# Patient Record
Sex: FEMALE | Race: OTHER | HISPANIC OR LATINO
[De-identification: names, ages, dates, MRNs, and addresses within clinical notes are randomized per-mention and may not be internally consistent; named-entity substitution may affect disease eponyms.]

---

## 2018-03-13 ENCOUNTER — APPOINTMENT (OUTPATIENT)
Dept: ENDOCRINOLOGY | Facility: CLINIC | Age: 38
End: 2018-03-13

## 2018-03-15 ENCOUNTER — APPOINTMENT (OUTPATIENT)
Dept: INTERNAL MEDICINE | Facility: CLINIC | Age: 38
End: 2018-03-15
Payer: COMMERCIAL

## 2018-03-15 VITALS
HEART RATE: 71 BPM | HEIGHT: 62 IN | TEMPERATURE: 98.2 F | BODY MASS INDEX: 26.31 KG/M2 | WEIGHT: 143 LBS | DIASTOLIC BLOOD PRESSURE: 70 MMHG | SYSTOLIC BLOOD PRESSURE: 106 MMHG | OXYGEN SATURATION: 98 % | RESPIRATION RATE: 15 BRPM

## 2018-03-15 DIAGNOSIS — Z83.42 FAMILY HISTORY OF FAMILIAL HYPERCHOLESTEROLEMIA: ICD-10-CM

## 2018-03-15 DIAGNOSIS — Z00.00 ENCOUNTER FOR GENERAL ADULT MEDICAL EXAMINATION W/OUT ABNORMAL FINDINGS: ICD-10-CM

## 2018-03-15 DIAGNOSIS — Z78.9 OTHER SPECIFIED HEALTH STATUS: ICD-10-CM

## 2018-03-15 DIAGNOSIS — H91.90 UNSPECIFIED HEARING LOSS, UNSPECIFIED EAR: ICD-10-CM

## 2018-03-15 DIAGNOSIS — Z83.49 FAMILY HISTORY OF OTHER ENDOCRINE, NUTRITIONAL AND METABOLIC DISEASES: ICD-10-CM

## 2018-03-15 PROCEDURE — 99386 PREV VISIT NEW AGE 40-64: CPT

## 2020-09-23 ENCOUNTER — APPOINTMENT (OUTPATIENT)
Dept: GASTROENTEROLOGY | Facility: CLINIC | Age: 40
End: 2020-09-23
Payer: COMMERCIAL

## 2020-09-23 ENCOUNTER — LABORATORY RESULT (OUTPATIENT)
Age: 40
End: 2020-09-23

## 2020-09-23 VITALS
HEIGHT: 61 IN | TEMPERATURE: 98 F | WEIGHT: 154 LBS | BODY MASS INDEX: 29.07 KG/M2 | SYSTOLIC BLOOD PRESSURE: 127 MMHG | DIASTOLIC BLOOD PRESSURE: 88 MMHG | OXYGEN SATURATION: 96 % | HEART RATE: 83 BPM

## 2020-09-23 DIAGNOSIS — Z11.59 ENCOUNTER FOR SCREENING FOR OTHER VIRAL DISEASES: ICD-10-CM

## 2020-09-23 DIAGNOSIS — E78.00 PURE HYPERCHOLESTEROLEMIA, UNSPECIFIED: ICD-10-CM

## 2020-09-23 DIAGNOSIS — R10.11 RIGHT UPPER QUADRANT PAIN: ICD-10-CM

## 2020-09-23 PROCEDURE — 99204 OFFICE O/P NEW MOD 45 MIN: CPT

## 2020-09-23 RX ORDER — DICYCLOMINE HYDROCHLORIDE 10 MG/1
10 CAPSULE ORAL 3 TIMES DAILY
Qty: 90 | Refills: 3 | Status: ACTIVE | COMMUNITY
Start: 2020-09-23 | End: 1900-01-01

## 2020-09-23 NOTE — HISTORY OF PRESENT ILLNESS
[None] : had no significant interval events [Heartburn] : denies heartburn [Vomiting] : denies vomiting [Diarrhea] : denies diarrhea [Constipation] : denies constipation [Yellow Skin Or Eyes (Jaundice)] : denies jaundice [Rectal Pain] : denies rectal pain [Wt Gain ___ Lbs] : recent [unfilled] ~Upound(s) weight gain [Nausea] : nausea [Abdominal Pain] : abdominal pain [Abdominal Swelling] : abdominal swelling [Wt Loss ___ Lbs] : no recent weight loss [GERD] : no gastroesophageal reflux disease [Hiatus Hernia] : no hiatus hernia [Peptic Ulcer Disease] : no peptic ulcer disease [Pancreatitis] : no pancreatitis [Cholelithiasis] : no cholelithiasis [Kidney Stone] : no kidney stone [Inflammatory Bowel Disease] : no inflammatory bowel disease [Irritable Bowel Syndrome] : no irritable bowel syndrome [Diverticulitis] : no diverticulitis [Alcohol Abuse] : no alcohol abuse [Malignancy] : no malignancy [Abdominal Surgery] : no abdominal surgery [Appendectomy] : no appendectomy [Cholecystectomy] : no cholecystectomy [de-identified] : The patient is a 40-year-old  female with past medical history significant for hypercholesterolemia who was referred to my office by Dr. Cortney Hester for abdominal pain and dyspepsia. The patient also admits to having atypical chest pain and nausea. I was asked to render an opinion for consultation for the above complaints.   The patient states that she is feeling uncomfortable.   The patient denies any prior exposure to hepatitis A, B or C.  The patient denies any large doses of nonsteroidal anti-inflammatory drugs or acetaminophen.   The patient denies sharing needles, razors, nail clippers, nail files, scissors, et cetera.  The patient denies any EtOH abuse, cocaine use or intravenous drug use.   The patient denies any prior blood transfusions, sexual indiscretions, tattoos or piercing.  The patient admits to having prior surgery.  The history of surgery is significant for a prior D+C.  The patient admits to a family history of GI or liver problems.  The patient’s father had a history of fatty liver. The patient complains of abdominal pain.  The patient describes the abdominal pain as a sharp, crampy, intermittent midepigastric and right upper quadrant discomfort that is nonradiating in nature.  The abdominal pain is worse with stress.  The abdominal pain is worse with meals and improves with passing gas or having a bowel movement.  The abdominal pain is described as being moderate in nature.  The abdominal pain occurs at night and in the morning.  The abdominal pain can occur at any time.   The abdominal pain never has awakened the patient from sleep.  The abdominal pain is not relieved with any medications.  The abdominal pain is associated with abdominal gas and bloating.  The patient complains of occasional nausea but denies any vomiting.  The patient denies any gastroesophageal reflux disease or dysphagia. The patient complains of atypical chest pain but denies any shortness of breath or palpitations.  The chest pain is described as a sharp, intermittent substernal discomfort that is nonradiating in nature.  The patient denies any diaphoresis.  The chest pain is described as being 5 out of 10 in intensity.  The chest pain can occur at any time.  The chest pain is worse at night and early morning.  The chest pain improves with passing gas.  The chest pain is unrelated to meals.  The chest pain never awakened the patient from sleep.  The patient denies any constipation or diarrhea.  The patient has 1 bowel movement every 1 to 2 days.  The patient denies a change in bowel habits.  The patient denies a change in caliber of stool.  The patient denies melena or hematemesis.  The lower GI bleeding is associated with internal hemorrhoids.  The patient complains of occasional rectal discomfort but denies any rectal pruritus. The patient denies any weight loss or anorexia.  The patient admits to gaining weight recently.  She denies any fevers or chills.  The patient denies any jaundice or pruritus.  The patient complains of occasional lower back pain.  The patient had a prior history of anemia secondary to heavy menstrual cycles.  She was previously followed by her gynecologist, Dr. Escalante.    The patient was treated with iron supplementation with improvement of the anemia.  The patient was previously followed by a hematologist, Dr. Osiel Parry.  She was treated with IV iron supplementation and later  switched to PO iron supplementation.   The patient had an abdominal ultrasound performed on May 5, 2009.  The abdominal ultrasound revealed no evidence of cholelithiasis or cholecystitis and no evidence of biliary obstruction.  The patient had a CAT scan of the abdomen and pelvis performed on May 5, 2009.  The CAT scan revealed a probable corpus luteal cyst in the left ovary with associated hemoperitoneum and no evidence of bowel obstruction or pneumoperitoneum and no evidence of appendicitis.  The patient admits to having a prior colonoscopy approximately 10 years ago by Dr. Wai Bobby.  According to the patient, the colonoscopy was unremarkable.  Stool studies performed on September 15, 2012 were negative for culture and sensitivity, ova and parasite x 3 and C. Shiga toxin.  The patient admits to having a recent abdominal ultrasound.  According to the patient, the abdominal ultrasound revealed a hepatic cyst.  The patient admits to having a prior colonoscopy performed by another gastroenterologist.  According to the patient, the colonoscopic findings revealed diverticulosis and internal hemorrhoids.   The patient's last menstrual period was on 2020. The patient's periods are regular at 28 to 30 days.  The patient's menstrual periods are light for 2 days.  The patient is a .  The patient's first menstrual period was at age 13. The patient admits to a family history of GI problems.  The patient’s father had a history of fatty liver. [de-identified] : (-) smoking, (-) ETOH, (-) IVDA\par

## 2020-09-25 LAB
ALBUMIN SERPL ELPH-MCNC: 4.5 G/DL
ALP BLD-CCNC: 110 U/L
ALT SERPL-CCNC: 184 U/L
AMYLASE/CREAT SERPL: 90 U/L
ANA SER IF-ACNC: NEGATIVE
ANION GAP SERPL CALC-SCNC: 15 MMOL/L
AST SERPL-CCNC: 114 U/L
BASOPHILS # BLD AUTO: 0.04 K/UL
BASOPHILS NFR BLD AUTO: 0.8 %
BILIRUB SERPL-MCNC: 0.5 MG/DL
BUN SERPL-MCNC: 10 MG/DL
CALCIUM SERPL-MCNC: 9.6 MG/DL
CHLORIDE SERPL-SCNC: 101 MMOL/L
CHOLEST SERPL-MCNC: 209 MG/DL
CHOLEST/HDLC SERPL: 3.4 RATIO
CO2 SERPL-SCNC: 22 MMOL/L
CREAT SERPL-MCNC: 0.66 MG/DL
EOSINOPHIL # BLD AUTO: 0.08 K/UL
EOSINOPHIL NFR BLD AUTO: 1.7 %
ERYTHROCYTE [SEDIMENTATION RATE] IN BLOOD BY WESTERGREN METHOD: 24 MM/HR
FERRITIN SERPL-MCNC: 187 NG/ML
GGT SERPL-CCNC: 348 U/L
GLUCOSE SERPL-MCNC: 98 MG/DL
HBV CORE IGG+IGM SER QL: NONREACTIVE
HBV CORE IGM SER QL: NONREACTIVE
HBV E AB SER QL: NEGATIVE
HBV E AG SER QL: NEGATIVE
HBV SURFACE AB SER QL: NONREACTIVE
HBV SURFACE AG SER QL: NONREACTIVE
HCT VFR BLD CALC: 41.4 %
HCV AB SER QL: NONREACTIVE
HCV S/CO RATIO: 0.08 S/CO
HDLC SERPL-MCNC: 61 MG/DL
HEMOCCULT STL QL: NEGATIVE
HEPATITIS A IGG ANTIBODY: REACTIVE
HGB BLD-MCNC: 13.7 G/DL
IGA SER QL IEP: 113 MG/DL
IMM GRANULOCYTES NFR BLD AUTO: 0.4 %
IRON SATN MFR SERPL: 46 %
IRON SERPL-MCNC: 186 UG/DL
LDLC SERPL CALC-MCNC: 109 MG/DL
LPL SERPL-CCNC: 33 U/L
LYMPHOCYTES # BLD AUTO: 1.59 K/UL
LYMPHOCYTES NFR BLD AUTO: 33.3 %
MAN DIFF?: NORMAL
MCHC RBC-ENTMCNC: 31.1 PG
MCHC RBC-ENTMCNC: 33.1 GM/DL
MCV RBC AUTO: 93.9 FL
MONOCYTES # BLD AUTO: 0.46 K/UL
MONOCYTES NFR BLD AUTO: 9.6 %
NEUTROPHILS # BLD AUTO: 2.58 K/UL
NEUTROPHILS NFR BLD AUTO: 54.2 %
PLATELET # BLD AUTO: 213 K/UL
POTASSIUM SERPL-SCNC: 4.1 MMOL/L
PROT SERPL-MCNC: 7.1 G/DL
RBC # BLD: 4.41 M/UL
RBC # FLD: 12.2 %
RHEUMATOID FACT SER QL: <10 IU/ML
SARS-COV-2 IGG SERPL IA-ACNC: 132 INDEX
SARS-COV-2 IGG SERPL QL IA: POSITIVE
SODIUM SERPL-SCNC: 138 MMOL/L
TIBC SERPL-MCNC: 403 UG/DL
TRIGL SERPL-MCNC: 196 MG/DL
TSH SERPL-ACNC: 2.88 UIU/ML
TTG IGA SER IA-ACNC: <1.2 U/ML
TTG IGA SER-ACNC: NEGATIVE
TTG IGG SER IA-ACNC: 1.6 U/ML
TTG IGG SER IA-ACNC: NEGATIVE
UIBC SERPL-MCNC: 217 UG/DL
WBC # FLD AUTO: 4.77 K/UL

## 2020-10-21 ENCOUNTER — APPOINTMENT (OUTPATIENT)
Dept: GASTROENTEROLOGY | Facility: CLINIC | Age: 40
End: 2020-10-21
Payer: COMMERCIAL

## 2020-10-21 VITALS
WEIGHT: 149 LBS | HEIGHT: 61 IN | HEART RATE: 75 BPM | OXYGEN SATURATION: 98 % | BODY MASS INDEX: 28.13 KG/M2 | DIASTOLIC BLOOD PRESSURE: 77 MMHG | SYSTOLIC BLOOD PRESSURE: 118 MMHG | TEMPERATURE: 97.7 F

## 2020-10-21 PROCEDURE — 99072 ADDL SUPL MATRL&STAF TM PHE: CPT

## 2020-10-21 PROCEDURE — 99214 OFFICE O/P EST MOD 30 MIN: CPT | Mod: 25

## 2020-10-21 NOTE — ASSESSMENT
[FreeTextEntry1] : Dyspepsia: The patient complains of dyspeptic symptoms.  The patient was advised to abide by an anti-gas diet.  The patient was given a pamphlet for anti-gas.  The patient and I reviewed the anti-gas diet at length. The patient is to start on a trial of Phazyme one tablet 3 times a day p.r.n. abdominal pain and gas.\par Abnormal Imaging Study: The abdominal ultrasound performed on October 6, 2020 revealed hepatomegaly with hepatic cysts and diffuse hepatic steatosis. There were no additional abnormal findings.  The abdominal ultrasound performed on October 31, 2014 a small benign-appearing hepatic cyst otherwise negative study. The repeat abdominal ultrasound performed on April 30, 2019 revealed mildly limited evaluation due to overlying bowel gas. There is an echogenic liver compatible with fatty infiltration with an indeterminate 1.7 cm right hepatic lobe lesion. There was no evidence of cholelithiasis. There is trace mildly hypoechoic nonmobile probable gallbladder sludge along the posterior wall which may represent tumefactive sludge. The MRI of the liver with and without IV contrast performed on May 15, 2019 revealed a 1.1 cm hepatic dome lesion likely an atypical hemangioma which corresponds to the findings on recent abdominal ultrasound. Also noted was mild diffuse hepatic steatosis a normal gallbladder.  I recommend a repeat MRI of the liver with and without IV contrast when the patient returns to the office to assess the stability of the right hepatic dome lesion.  the patient agreed and will followup.  \par Elevated Liver Enzymes: The blood work performed on September 23, 2020 revealed an elevated ESR of 24 mm/hr, elevated liver enzymes with an AST/ALT/GGTP of 114/184/348 U/L, respectively, an elevated iron level of 186 ug/dl, an elevated cholesterol/triglyceride level of 209/196 mg/dl, respectively, and elevated ferritin level of 187 ng/mL, a reactive hepatitis A IgG antibody. The COVID 19 IgG antibody was positive with an index of 132.00.\par Fatty Liver:  The patient had an imaging study suggestive of fatty infiltration of the liver.  The patient denies any jaundice or pruritus.  The patient denies any alcohol use.  The patient denies taking large doses of nonsteroidal anti-inflammatory drugs or acetaminophen.  The findings are suggestive of fatty liver.  The patient and I had a long discussion regarding the risks of fatty liver and possible progression to cirrhosis.  The patient was told of the possible increased risk of developing liver failure, cirrhosis, ascites, GI bleeding secondary to varices, hepatic encephalopathy, bleeding tendencies and liver cancer.  The patient was told of the importance of follow-up.  The patient was advised to follow up every 6 months for blood work and imaging studies. The patient agreed and will follow up. The patient was advised to lose weight. I recommend a trial of vitamin E supplementation for the fatty liver.  If the liver enzymes remain elevated, the patient may require a trial of Pioglitazone for the fatty liver. I recommend avoid alcohol and hepato-toxic agents.  The patient was also advised to avoid NSAIDs, Acetaminophen and any other hepatotoxic drugs. The patient was also advised not to share needles, razors, scissors, nail clippers, etc.. The patient is to continue close follow-up in our office for blood work and exams.  If the liver enzymes remain elevated, the patient may require a CT guided liver biopsy to assess the liver parenchyma for possible treatment.  We had a long discussion regarding the risks and benefits of the procedure.  The patient was told of the risks of bleeding, perforation, infections, emergency surgery and missing lesions.  The patient agreed and will follow-up to reassess the symptoms.  \par Follow-up: The patient is to follow-up in the office in 3 months to reassess the symptoms. The patient was told to call the office if any further problems. \par

## 2020-10-21 NOTE — HISTORY OF PRESENT ILLNESS
[None] : had no significant interval events [Heartburn] : denies heartburn [Nausea] : denies nausea [Vomiting] : denies vomiting [Diarrhea] : denies diarrhea [Constipation] : denies constipation [Yellow Skin Or Eyes (Jaundice)] : denies jaundice [Abdominal Pain] : denies abdominal pain [Rectal Pain] : denies rectal pain [Wt Gain ___ Lbs] : recent [unfilled] ~Upound(s) weight gain [Abdominal Swelling] : abdominal swelling [Wt Loss ___ Lbs] : no recent weight loss [GERD] : no gastroesophageal reflux disease [Hiatus Hernia] : no hiatus hernia [Peptic Ulcer Disease] : no peptic ulcer disease [Pancreatitis] : no pancreatitis [Cholelithiasis] : no cholelithiasis [Kidney Stone] : no kidney stone [Inflammatory Bowel Disease] : no inflammatory bowel disease [Irritable Bowel Syndrome] : no irritable bowel syndrome [Diverticulitis] : no diverticulitis [Alcohol Abuse] : no alcohol abuse [Malignancy] : no malignancy [Abdominal Surgery] : no abdominal surgery [Appendectomy] : no appendectomy [Cholecystectomy] : no cholecystectomy [de-identified] : The patient denies any prior exposure to hepatitis A, B or C. The patient denies any large doses of nonsteroidal anti-inflammatory drugs or acetaminophen. The patient denies sharing needles, razors, nail clippers, nail files, scissors, et cetera. The patient denies any EtOH abuse, cocaine use or intravenous drug use. The patient denies any prior blood transfusions, sexual indiscretions, tattoos or piercing. The patient admits to having prior surgery. The history of surgery is significant for a prior D+C. The patient admits to a family history of GI or liver problems. The patient’s father had a history of fatty liver. The patient states that she is feeling fine. The patient denies any jaundice or pruritus.  The patient denies any chronic lower back pain. The patient denies any abdominal pain.  The patient complains of abdominal gas and bloating.  The patient denies any nausea or vomiting.  The patient denies any gastroesophageal reflux disease or dysphagia.  The patient denies any atypical chest pain, shortness of breath or palpitations.  The patient denies any diaphoresis. The patient denies any constipation or diarrhea.  The patient has 1 bowel movement every 1 to 2 days.  The patient denies a change in bowel habits.  The patient denies a change in caliber of stool.  The patient denies having mucus discharge with the bowel movements.  The patient denies any bright red blood per rectum, melena or hematemesis.  The lower GI bleeding is associated with diarrhea and constipation.  The patient complains of rectal pain and pruritus but denies any rectal pain or rectal pruritus.  The patient denies any weight loss or anorexia.  The patient admits to gaining weight recently.  She denies any fevers or chills.  The abdominal ultrasound performed on October 6, 2020 revealed hepatomegaly with hepatic cysts and diffuse hepatic steatosis. There were no additional abnormal findings.  The abdominal ultrasound performed on October 31, 2014 a small benign-appearing hepatic cyst otherwise negative study. The repeat abdominal ultrasound performed on April 30, 2019 revealed mildly limited evaluation due to overlying bowel gas. There is an echogenic liver compatible with fatty infiltration with an indeterminate 1.7 cm right hepatic lobe lesion. There was no evidence of cholelithiasis. There is trace mildly hypoechoic nonmobile probable gallbladder sludge along the posterior wall which may represent tumefactive sludge. The MRI of the liver with and without IV contrast performed on May 15, 2019 revealed a 1.1 cm hepatic dome lesion likely an atypical hemangioma which corresponds to the findings on recent abdominal ultrasound. Also noted was mild diffuse hepatic steatosis a normal gallbladder.  The blood work performed on September 23, 2020 revealed an elevated ESR of 24 mm/hr, elevated liver enzymes with an AST/ALT/GGTP of 114/184/348 U/L, respectively, an elevated iron level of 186 ug/dl, an elevated cholesterol/triglyceride level of 209/196 mg/dl, respectively, and elevated ferritin level of 187 ng/mL, a reactive hepatitis A IgG antibody. The COVID 19 IgG antibody was positive with an index of 132.00. The patient had a prior history of anemia secondary to heavy menstrual cycles. She was previously followed by her gynecologist, Dr. Escalante. The patient was treated with iron supplementation with improvement of the anemia. The patient was previously followed by a hematologist, Dr. Osiel Parry. She was treated with IV iron supplementation and later switched to PO iron supplementation. The patient had a CAT scan of the abdomen and pelvis performed on May 5, 2009. The CAT scan revealed a probable corpus luteal cyst in the left ovary with associated hemoperitoneum and no evidence of bowel obstruction or pneumoperitoneum and no evidence of appendicitis. The patient admits to having a prior colonoscopy approximately 10 years ago by Dr. Wai Bobby. According to the patient, the colonoscopy was unremarkable. Stool studies performed on September 15, 2012 were negative for culture and sensitivity, ova and parasite x 3 and C. Shiga toxin. The patient admits to having a recent abdominal ultrasound. According to the patient, the abdominal ultrasound revealed a hepatic cyst. The patient admits to having a prior colonoscopy performed by another gastroenterologist. According to the patient, the colonoscopic findings revealed diverticulosis and internal hemorrhoids. The patient admits to a family history of GI problems. The patient’s father had a history of fatty liver.  [de-identified] : (-) smoking, (+) prior ETOH use stopped x 1 month, (-) IVDA\par

## 2021-01-25 ENCOUNTER — APPOINTMENT (OUTPATIENT)
Dept: GASTROENTEROLOGY | Facility: CLINIC | Age: 41
End: 2021-01-25
Payer: COMMERCIAL

## 2021-01-25 VITALS
TEMPERATURE: 97.5 F | WEIGHT: 147 LBS | OXYGEN SATURATION: 98 % | DIASTOLIC BLOOD PRESSURE: 70 MMHG | HEART RATE: 73 BPM | HEIGHT: 61 IN | SYSTOLIC BLOOD PRESSURE: 114 MMHG | BODY MASS INDEX: 27.75 KG/M2

## 2021-01-25 LAB
AFP-TM SERPL-MCNC: <1.8 NG/ML
ALBUMIN SERPL ELPH-MCNC: 4.5 G/DL
ALP BLD-CCNC: 92 U/L
ALT SERPL-CCNC: 34 U/L
AMYLASE/CREAT SERPL: 99 U/L
ANION GAP SERPL CALC-SCNC: 12 MMOL/L
AST SERPL-CCNC: 29 U/L
BASOPHILS # BLD AUTO: 0.03 K/UL
BASOPHILS NFR BLD AUTO: 0.6 %
BILIRUB SERPL-MCNC: 0.3 MG/DL
BUN SERPL-MCNC: 13 MG/DL
CALCIUM SERPL-MCNC: 9.3 MG/DL
CHLORIDE SERPL-SCNC: 105 MMOL/L
CO2 SERPL-SCNC: 24 MMOL/L
CREAT SERPL-MCNC: 0.73 MG/DL
EOSINOPHIL # BLD AUTO: 0.1 K/UL
EOSINOPHIL NFR BLD AUTO: 2.1 %
ERYTHROCYTE [SEDIMENTATION RATE] IN BLOOD BY WESTERGREN METHOD: 3 MM/HR
FERRITIN SERPL-MCNC: 19 NG/ML
GGT SERPL-CCNC: 30 U/L
GLUCOSE SERPL-MCNC: 83 MG/DL
HCT VFR BLD CALC: 38.8 %
HGB BLD-MCNC: 12.6 G/DL
IMM GRANULOCYTES NFR BLD AUTO: 0.2 %
LPL SERPL-CCNC: 34 U/L
LYMPHOCYTES # BLD AUTO: 1.82 K/UL
LYMPHOCYTES NFR BLD AUTO: 38.5 %
MAN DIFF?: NORMAL
MCHC RBC-ENTMCNC: 29.6 PG
MCHC RBC-ENTMCNC: 32.5 GM/DL
MCV RBC AUTO: 91.3 FL
MONOCYTES # BLD AUTO: 0.45 K/UL
MONOCYTES NFR BLD AUTO: 9.5 %
NEUTROPHILS # BLD AUTO: 2.32 K/UL
NEUTROPHILS NFR BLD AUTO: 49.1 %
PLATELET # BLD AUTO: 221 K/UL
POTASSIUM SERPL-SCNC: 4.2 MMOL/L
PROT SERPL-MCNC: 6.9 G/DL
RBC # BLD: 4.25 M/UL
RBC # FLD: 12.8 %
SODIUM SERPL-SCNC: 141 MMOL/L
WBC # FLD AUTO: 4.73 K/UL

## 2021-01-25 PROCEDURE — 99213 OFFICE O/P EST LOW 20 MIN: CPT

## 2021-01-25 PROCEDURE — 99072 ADDL SUPL MATRL&STAF TM PHE: CPT

## 2021-01-25 NOTE — HISTORY OF PRESENT ILLNESS
[None] : had no significant interval events [Heartburn] : denies heartburn [Nausea] : denies nausea [Vomiting] : denies vomiting [Constipation] : denies constipation [Diarrhea] : denies diarrhea [Yellow Skin Or Eyes (Jaundice)] : denies jaundice [Abdominal Pain] : denies abdominal pain [Abdominal Swelling] : denies abdominal swelling [Rectal Pain] : denies rectal pain [Wt Loss ___ Lbs] : recent [unfilled] ~Upound(s) weight loss [Wt Gain ___ Lbs] : no recent weight gain [GERD] : no gastroesophageal reflux disease [Hiatus Hernia] : no hiatus hernia [Peptic Ulcer Disease] : no peptic ulcer disease [Pancreatitis] : no pancreatitis [Cholelithiasis] : no cholelithiasis [Kidney Stone] : no kidney stone [Inflammatory Bowel Disease] : no inflammatory bowel disease [Irritable Bowel Syndrome] : no irritable bowel syndrome [Diverticulitis] : no diverticulitis [Alcohol Abuse] : no alcohol abuse [Malignancy] : no malignancy [Abdominal Surgery] : no abdominal surgery [Appendectomy] : no appendectomy [Cholecystectomy] : no cholecystectomy [de-identified] : The patient denies any prior exposure to hepatitis A, B or C. The patient denies any large doses of nonsteroidal anti-inflammatory drugs or acetaminophen. The patient denies sharing needles, razors, nail clippers, nail files, scissors, et cetera. The patient denies any EtOH abuse, cocaine use or intravenous drug use. The patient denies any prior blood transfusions, sexual indiscretions, tattoos or piercing. The patient admits to having prior surgery. The history of surgery is significant for a prior D+C. The patient admits to a family history of GI or liver problems. The patient’s father had a history of fatty liver. The patient states that she is feeling better. The patient denies any jaundice or pruritus.  The patient denies any chronic lower back pain. The patient denies any abdominal pain.  The patient denies any abdominal gas and bloating.  The patient denies any nausea or vomiting.  The patient denies any gastroesophageal reflux disease or dysphagia.  The patient denies any atypical chest pain, shortness of breath or palpitations.  The patient denies any diaphoresis. The patient denies any constipation or diarrhea.  The patient has 1 bowel movement a day.  The patient denies a change in bowel habits.  The patient denies a change in caliber of stool.  The patient denies having mucus discharge with the bowel movements.  The patient denies any bright red blood per rectum, melena or hematemesis.  The patient denies any rectal pain or rectal pruritus.  The patient complains of weight loss but denies any anorexia.  The patient admits to losing 9 pounds over the past 4 months. She attributes to weight loss to recent change in diet.   She denies any fevers or chills.  The abdominal ultrasound performed on October 6, 2020 revealed hepatomegaly with hepatic cysts and diffuse hepatic steatosis. There were no additional abnormal findings. The MRI of the liver with and without IV contrast performed on May 15, 2019 revealed a 1.1 cm hepatic dome lesion likely an atypical hemangioma which corresponds to the findings on recent abdominal ultrasound. Also noted was mild diffuse hepatic steatosis a normal gallbladder.  The CAT scan of the abdomen and pelvis performed on May 5, 2009 revealed a probable corpus luteal cyst in the left ovary with associated hemoperitoneum and no evidence of bowel obstruction or pneumoperitoneum and no evidence of appendicitis. The blood work performed on September 23, 2020 revealed an elevated ESR of 24 mm/hr, elevated liver enzymes with an AST/ALT/GGTP of 114/184/348 U/L, respectively, an elevated iron level of 186 ug/dl, an elevated cholesterol/triglyceride level of 209/196 mg/dl, respectively, and elevated ferritin level of 187 ng/mL, a reactive hepatitis A IgG antibody. The COVID 19 IgG antibody was positive with an index of 132.00. The patient had a prior history of anemia secondary to heavy menstrual cycles. She was previously followed by her gynecologist, Dr. Escalante. The patient was treated with iron supplementation with improvement of the anemia. The patient was previously followed by a hematologist, Dr. Osiel Parry. She was treated with IV iron supplementation and later switched to PO iron supplementation. The patient admits to having a prior colonoscopy approximately 10 years ago by Dr. Wai Bobby. According to the patient, the colonoscopy was unremarkable. The patient admits to having a prior colonoscopy performed by another gastroenterologist. According to the patient, the colonoscopic findings revealed diverticulosis and internal hemorrhoids. The patient admits to a family history of GI problems. The patient’s father had a history of fatty liver.  [de-identified] : (-) smoking, (-) ETOH, (-) IVDA\par

## 2021-01-25 NOTE — ASSESSMENT
[FreeTextEntry1] : Abnormal Imaging Study: The abdominal ultrasound performed on October 6, 2020 revealed hepatomegaly with hepatic cysts and diffuse hepatic steatosis. There were no additional abnormal findings. The MRI of the liver with and without IV contrast performed on May 15, 2019 revealed a 1.1 cm hepatic dome lesion likely an atypical hemangioma which corresponds to the findings on recent abdominal ultrasound. Also noted was mild diffuse hepatic steatosis a normal gallbladder. I recommend a repeat MRI of the liver with and without IV contrast when the patient returns to the office to assess the stability of the right hepatic dome lesion. The patient agreed and will followup. \par Elevated Liver Enzymes: The blood work performed on September 23, 2020 revealed an elevated ESR of 24 mm/hr, elevated liver enzymes with an AST/ALT/GGTP of 114/184/348 U/L, respectively, an elevated iron level of 186 ug/dl, an elevated cholesterol/triglyceride level of 209/196 mg/dl, respectively, and elevated ferritin level of 187 ng/mL, a reactive hepatitis A IgG antibody. The COVID 19 IgG antibody was positive with an index of 132.00.I recommend repeat LFTs to reassess the liver.  I will try to obtain the recent blood work from the patient's PMD. \par Fatty Liver: The patient had an imaging study suggestive of fatty infiltration of the liver. The patient denies any jaundice or pruritus. The patient denies any alcohol use. The patient denies taking large doses of nonsteroidal anti-inflammatory drugs or acetaminophen. The findings are suggestive of fatty liver. The patient and I had a long discussion regarding the risks of fatty liver and possible progression to cirrhosis. The patient was told of the possible increased risk of developing liver failure, cirrhosis, ascites, GI bleeding secondary to varices, hepatic encephalopathy, bleeding tendencies and liver cancer. The patient was told of the importance of follow-up. The patient was advised to follow up every 6 months for blood work and imaging studies. The patient agreed and will follow up. The patient was advised to lose weight. I recommend a trial of vitamin E supplementation for the fatty liver. If the liver enzymes remain elevated, the patient may require a trial of Pioglitazone for the fatty liver. I recommend avoid alcohol and hepato-toxic agents. The patient was also advised to avoid NSAIDs, Acetaminophen and any other hepatotoxic drugs. The patient was also advised not to share needles, razors, scissors, nail clippers, etc.. The patient is to continue close follow-up in our office for blood work and exams. If the liver enzymes remain elevated, the patient may require a CT guided liver biopsy to assess the liver parenchyma for possible treatment. We had a long discussion regarding the risks and benefits of the procedure. The patient was told of the risks of bleeding, perforation, infections, emergency surgery and missing lesions. The patient agreed and will follow-up to reassess the symptoms. \par Follow-up: The patient is to follow-up in the office in 3 months to reassess the symptoms. The patient was told to call the office if any further problems. \par

## 2021-03-25 ENCOUNTER — APPOINTMENT (OUTPATIENT)
Age: 41
End: 2021-03-25
Payer: COMMERCIAL

## 2021-03-25 PROCEDURE — 0002A: CPT

## 2021-11-29 ENCOUNTER — APPOINTMENT (OUTPATIENT)
Dept: GASTROENTEROLOGY | Facility: CLINIC | Age: 41
End: 2021-11-29
Payer: COMMERCIAL

## 2021-11-29 VITALS
DIASTOLIC BLOOD PRESSURE: 80 MMHG | WEIGHT: 147 LBS | OXYGEN SATURATION: 99 % | HEIGHT: 61 IN | SYSTOLIC BLOOD PRESSURE: 120 MMHG | HEART RATE: 83 BPM | BODY MASS INDEX: 27.75 KG/M2 | TEMPERATURE: 97.9 F

## 2021-11-29 PROCEDURE — 99214 OFFICE O/P EST MOD 30 MIN: CPT

## 2021-11-29 RX ORDER — MECLIZINE HYDROCHLORIDE 25 MG/1
25 TABLET ORAL
Qty: 14 | Refills: 0 | Status: ACTIVE | COMMUNITY
Start: 2021-08-30

## 2021-11-29 NOTE — ASSESSMENT
[FreeTextEntry1] : GERD: The patient was advised to avoid late-night meals and dietary indiscretions.  The patient was advised to avoid fried and fatty foods.  The patient was advised to abide by an anti-GERD diet. The patient was given a pamphlet for anti-GERD.  The patient and I reviewed the anti-GERD diet at length. I recommend a trial of Pepcid 20 mg twice a day PRNfor the symptoms.\par If the symptoms persist, the patient may require an upper endoscopy to assess for peptic ulcer disease versus esophagitis.  The patient was told of the risks and benefits of the procedure.  The patient was told of the risks of perforation, emergency surgery, bleeding, infections and missed lesions.  The patient agreed and will follow-up to reassess the symptoms.\par Abnormal Imaging Study: The MRI of the liver with and without IV contrast performed on May 15, 2019 revealed a 1.1 cm hepatic dome lesion likely an atypical hemangioma which corresponds to the findings on recent abdominal ultrasound. Also noted was mild diffuse hepatic steatosis a normal gallbladder.  The MRI of the abdomen with IV contrast performed on March 1, 2021 revealed mild to moderate steatosis with an estimated that fraction of 14%. Also noted was a stable 1.1 cm hemangioma in segment 7 at the dome.  The abdominal ultrasound performed on October 6, 2020 revealed hepatomegaly with hepatic cysts and diffuse hepatic steatosis. There were no additional abnormal findings. The CAT scan of the abdomen and pelvis performed on May 5, 2009 revealed a probable corpus luteal cyst in the left ovary with associated hemoperitoneum and no evidence of bowel obstruction or pneumoperitoneum and no evidence of appendicitis. I recommend a repeat MRI of the liver with and without IV contrast when the patient returns to the office to assess the stability of the right hepatic dome lesion. The patient agreed and will followup. \par History of Elevated Liver Enzymes: The blood work performed on September 23, 2020 revealed an elevated ESR of 24 mm/hr, elevated liver enzymes with an AST/ALT/GGTP of 114/184/348 U/L, respectively, an elevated iron level of 186 ug/dl, an elevated cholesterol/triglyceride level of 209/196 mg/dl, respectively, and elevated ferritin level of 187 ng/mL, a reactive hepatitis A IgG antibody. The COVID 19 IgG antibody was positive with an index of 132.00.  The blood work performed on January 25, 2021 revealed normalization of the liver enzymes. \par the total bilirubin was 0.3 mg/dL, the alkaline phosphatase/AST/ALT/GGTP were 92/29/34/30 U/L, respectively.  I recommend repeat LFTs to reassess the liver. I will try to obtain the recent blood work from the patient's PMD. \par Fatty Liver: The patient had an imaging study suggestive of fatty infiltration of the liver. The patient denies any jaundice or pruritus. The patient denies any alcohol use. The patient denies taking large doses of nonsteroidal anti-inflammatory drugs or acetaminophen. The findings are suggestive of fatty liver. The patient and I had a long discussion regarding the risks of fatty liver and possible progression to cirrhosis. The patient was told of the possible increased risk of developing liver failure, cirrhosis, ascites, GI bleeding secondary to varices, hepatic encephalopathy, bleeding tendencies and liver cancer. The patient was told of the importance of follow-up. The patient was advised to follow up every 6 months for blood work and imaging studies. The patient agreed and will follow up. The patient was advised to lose weight. I recommend a trial of vitamin E supplementation for the fatty liver. If the liver enzymes remain elevated, the patient may require a trial of Pioglitazone for the fatty liver. I recommend avoid alcohol and hepato-toxic agents. The patient was also advised to avoid NSAIDs, Acetaminophen and any other hepatotoxic drugs. The patient was also advised not to share needles, razors, scissors, nail clippers, etc.. The patient is to continue close follow-up in our office for blood work and exams. If the liver enzymes remain elevated, the patient may require a CT guided liver biopsy to assess the liver parenchyma for possible treatment. We had a long discussion regarding the risks and benefits of the procedure. The patient was told of the risks of bleeding, perforation, infections, emergency surgery and missing lesions. The patient agreed and will follow-up to reassess the symptoms. \par Blood Work: I recommend blood work to assess the patient's symptoms. I recommend a CBC, SMA 24, amylase, lipase, ESR, TFTs, ,iron, TIBC, ferritin level and lipid profile.  I also recommend obtaining the recent blood work performed by the patient's PMD.\par Imaging Study: I recommend an imaging study to assess the symptoms. I recommend an abdominal ultrasound to assess the liver parenchyma and for liver lesions.\par Follow-up: The patient is to follow-up in the office in 6 months to reassess the symptoms. The patient was told to call the office if any further problems. \par \par \par

## 2021-11-29 NOTE — HISTORY OF PRESENT ILLNESS
[None] : had no significant interval events [Nausea] : denies nausea [Vomiting] : denies vomiting [Diarrhea] : denies diarrhea [Constipation] : denies constipation [Yellow Skin Or Eyes (Jaundice)] : denies jaundice [Abdominal Pain] : denies abdominal pain [Abdominal Swelling] : denies abdominal swelling [Rectal Pain] : denies rectal pain [Wt Gain ___ Lbs] : recent [unfilled] ~Upound(s) weight gain [Heartburn] : heartburn [GERD] : gastroesophageal reflux disease [Hiatus Hernia] : no hiatus hernia [Peptic Ulcer Disease] : no peptic ulcer disease [Pancreatitis] : no pancreatitis [Cholelithiasis] : no cholelithiasis [Kidney Stone] : no kidney stone [Inflammatory Bowel Disease] : no inflammatory bowel disease [Irritable Bowel Syndrome] : no irritable bowel syndrome [Diverticulitis] : no diverticulitis [Alcohol Abuse] : no alcohol abuse [Malignancy] : no malignancy [Abdominal Surgery] : no abdominal surgery [Appendectomy] : no appendectomy [Cholecystectomy] : no cholecystectomy [de-identified] : The patient denies any prior exposure to hepatitis A, B or C. The patient denies any large doses of nonsteroidal anti-inflammatory drugs or acetaminophen. The patient denies sharing needles, razors, nail clippers, nail files, scissors, et cetera. The patient denies any EtOH abuse, cocaine use or intravenous drug use. The patient denies any prior blood transfusions, sexual indiscretions, tattoos or piercing. The patient admits to having prior surgery. The history of surgery is significant for a prior D+C. The patient admits to a family history of GI or liver problems. The patient’s father had a history of fatty liver. The patient states that she is feeling fine. The patient denies any jaundice or pruritus.  The patient denies any complains of chronic lower back pain. The patient denies any abdominal pain.  The patient denies any abdominal gas and bloating.  The patient denies any nausea or vomiting.  The patient complains of occasional gastroesophageal reflux disease but denies any dysphagia.  The gastroesophageal reflux disease is worse after meals and late at night and in the early morning. The gastroesophageal reflux disease is improved with proton pump inhibitors, H2 blockers and antacids.   The patient denies any atypical chest pain, shortness of breath or palpitations.  The patient denies any diaphoresis. The patient denies any constipation or diarrhea.  The patient has 1 bowel movement every 1 to 2 days.  The patient denies a change in bowel habits.  The patient denies a change in caliber of stool.  The patient denies having mucus discharge with the bowel movements.  The patient denies any bright red blood per rectum, melena or hematemesis. The patient denies any rectal pain or rectal pruritus.  The patient denies any weight loss or anorexia.  The patient admits to gaining weight recently. The patient previously complained of weight loss but denied any anorexia. The patient admitted to losing 9 pounds over 4 months. She attributed to weight loss to recent change in diet. She denies any fevers or chills. The MRI of the liver with and without IV contrast performed on May 15, 2019 revealed a 1.1 cm hepatic dome lesion likely an atypical hemangioma which corresponds to the findings on recent abdominal ultrasound. Also noted was mild diffuse hepatic steatosis a normal gallbladder.  The MRI of the abdomen with IV contrast performed on March 1, 2021 revealed mild to moderate steatosis with an estimated that fraction of 14%. Also noted was a stable 1.1 cm hemangioma in segment 7 at the dome.  The abdominal ultrasound performed on October 6, 2020 revealed hepatomegaly with hepatic cysts and diffuse hepatic steatosis. There were no additional abnormal findings. The CAT scan of the abdomen and pelvis performed on May 5, 2009 revealed a probable corpus luteal cyst in the left ovary with associated hemoperitoneum and no evidence of bowel obstruction or pneumoperitoneum and no evidence of appendicitis. The blood work performed on January 25, 2021 revealed normalization of the liver enzymes. the total bilirubin was 0.3 mg/dL, the alkaline phosphatase/AST/ALT/GGTP were 92/29/34/30 U/L, respectively. The patient had a prior history of anemia secondary to heavy menstrual cycles. She was previously followed by her gynecologist, Dr. Escalante. The patient was treated with iron supplementation with improvement of the anemia. The patient was previously followed by a hematologist, Dr. Osiel Parry. She was treated with IV iron supplementation and later switched to PO iron supplementation. The patient admits to having a prior colonoscopy approximately 10 years ago by Dr. Wai Bobby. According to the patient, the colonoscopy was unremarkable. The patient admits to having a prior colonoscopy performed by another gastroenterologist. According to the patient, the colonoscopic findings revealed diverticulosis and internal hemorrhoids. The patient admits to a family history of GI problems. The patient’s father had a history of fatty liver.  [de-identified] : (-) smoking, (-) ETOH, (-) IVDA\par

## 2021-11-30 ENCOUNTER — NON-APPOINTMENT (OUTPATIENT)
Age: 41
End: 2021-11-30

## 2021-11-30 LAB
AFP-TM SERPL-MCNC: <1.8 NG/ML
ALBUMIN SERPL ELPH-MCNC: 4.5 G/DL
ALP BLD-CCNC: 87 U/L
ALT SERPL-CCNC: 26 U/L
AMYLASE/CREAT SERPL: 87 U/L
ANION GAP SERPL CALC-SCNC: 16 MMOL/L
AST SERPL-CCNC: 22 U/L
BASOPHILS # BLD AUTO: 0.04 K/UL
BASOPHILS NFR BLD AUTO: 0.9 %
BILIRUB SERPL-MCNC: 0.2 MG/DL
BUN SERPL-MCNC: 11 MG/DL
CALCIUM SERPL-MCNC: 9.3 MG/DL
CHLORIDE SERPL-SCNC: 105 MMOL/L
CHOLEST SERPL-MCNC: 225 MG/DL
CO2 SERPL-SCNC: 20 MMOL/L
CREAT SERPL-MCNC: 0.64 MG/DL
EOSINOPHIL # BLD AUTO: 0.25 K/UL
EOSINOPHIL NFR BLD AUTO: 5.4 %
ERYTHROCYTE [SEDIMENTATION RATE] IN BLOOD BY WESTERGREN METHOD: 15 MM/HR
FERRITIN SERPL-MCNC: 14 NG/ML
GGT SERPL-CCNC: 33 U/L
GLUCOSE SERPL-MCNC: 97 MG/DL
HCT VFR BLD CALC: 37.4 %
HDLC SERPL-MCNC: 37 MG/DL
HGB BLD-MCNC: 11.5 G/DL
IMM GRANULOCYTES NFR BLD AUTO: 0.2 %
IRON SATN MFR SERPL: 14 %
IRON SERPL-MCNC: 52 UG/DL
LDLC SERPL CALC-MCNC: 136 MG/DL
LPL SERPL-CCNC: 31 U/L
LYMPHOCYTES # BLD AUTO: 1.87 K/UL
LYMPHOCYTES NFR BLD AUTO: 40.4 %
MAN DIFF?: NORMAL
MCHC RBC-ENTMCNC: 26.8 PG
MCHC RBC-ENTMCNC: 30.7 GM/DL
MCV RBC AUTO: 87.2 FL
MONOCYTES # BLD AUTO: 0.43 K/UL
MONOCYTES NFR BLD AUTO: 9.3 %
NEUTROPHILS # BLD AUTO: 2.03 K/UL
NEUTROPHILS NFR BLD AUTO: 43.8 %
NONHDLC SERPL-MCNC: 188 MG/DL
PLATELET # BLD AUTO: 239 K/UL
POTASSIUM SERPL-SCNC: 4.2 MMOL/L
PROT SERPL-MCNC: 7.2 G/DL
RBC # BLD: 4.29 M/UL
RBC # FLD: 14.6 %
RHEUMATOID FACT SER QL: <10 IU/ML
SODIUM SERPL-SCNC: 140 MMOL/L
TIBC SERPL-MCNC: 377 UG/DL
TRIGL SERPL-MCNC: 262 MG/DL
TSH SERPL-ACNC: 2.88 UIU/ML
UIBC SERPL-MCNC: 325 UG/DL
WBC # FLD AUTO: 4.63 K/UL

## 2021-12-01 LAB — ANA SER IF-ACNC: NEGATIVE

## 2021-12-03 ENCOUNTER — NON-APPOINTMENT (OUTPATIENT)
Age: 41
End: 2021-12-03

## 2022-08-31 ENCOUNTER — APPOINTMENT (OUTPATIENT)
Dept: GASTROENTEROLOGY | Facility: CLINIC | Age: 42
End: 2022-08-31

## 2022-08-31 VITALS
OXYGEN SATURATION: 99 % | SYSTOLIC BLOOD PRESSURE: 114 MMHG | DIASTOLIC BLOOD PRESSURE: 76 MMHG | WEIGHT: 148 LBS | BODY MASS INDEX: 27.94 KG/M2 | HEART RATE: 80 BPM | HEIGHT: 61 IN

## 2022-08-31 DIAGNOSIS — R74.8 ABNORMAL LEVELS OF OTHER SERUM ENZYMES: ICD-10-CM

## 2022-08-31 DIAGNOSIS — R07.89 OTHER CHEST PAIN: ICD-10-CM

## 2022-08-31 PROCEDURE — 99214 OFFICE O/P EST MOD 30 MIN: CPT

## 2022-08-31 NOTE — HISTORY OF PRESENT ILLNESS
Attempted to contact the pt in regards to apt. No answer, left voicemail to return my call.     ----- Message from Kassandra Rapp sent at 9/9/2020  3:12 PM CDT -----  Contact: MATEUS ESPINOZA [[id number]]  Name of Caller: Mateus    Reason for Visit/Symptoms: wax removal   Best Contact Number or Confirm if Mychart Preferred: [phone]  Preferred Date/Time of Appointment: tuesdays and thursdays, early morning   Interested in Virtual Visit (yes/no) no   Additional Information: please call pt if audiogram is needed, has appt scheduled 9/10 at 8 am.         [None] : had no significant interval events [Heartburn] : denies heartburn [Nausea] : denies nausea [Vomiting] : denies vomiting [Diarrhea] : denies diarrhea [Constipation] : denies constipation [Yellow Skin Or Eyes (Jaundice)] : denies jaundice [Abdominal Pain] : denies abdominal pain [Rectal Pain] : denies rectal pain [Abdominal Swelling] : abdominal swelling [Wt Gain ___ Lbs] : no recent weight gain [Wt Loss ___ Lbs] : no recent weight loss [GERD] : no gastroesophageal reflux disease [Hiatus Hernia] : no hiatus hernia [Peptic Ulcer Disease] : no peptic ulcer disease [Pancreatitis] : no pancreatitis [Cholelithiasis] : no cholelithiasis [Kidney Stone] : no kidney stone [Inflammatory Bowel Disease] : no inflammatory bowel disease [Irritable Bowel Syndrome] : no irritable bowel syndrome [Diverticulitis] : no diverticulitis [Alcohol Abuse] : no alcohol abuse [Malignancy] : no malignancy [Abdominal Surgery] : no abdominal surgery [Appendectomy] : no appendectomy [Cholecystectomy] : no cholecystectomy [de-identified] : The patient denies any prior exposure to hepatitis A, B or C. The patient denies any large doses of nonsteroidal anti-inflammatory drugs or acetaminophen. The patient denies sharing needles, razors, nail clippers, nail files, scissors, et cetera. The patient denies any EtOH abuse, cocaine use or intravenous drug use. The patient denies any prior blood transfusions, sexual indiscretions, tattoos or piercing. The patient admits to having prior surgery. The history of surgery is significant for a prior D+C. The patient admits to a family history of GI or liver problems. The patient’s father had a history of fatty liver. The patient states that she is feeling fine. The patient denies any jaundice or pruritus.  The patient complains of occasional lower back pain. The patient denies any abdominal pain.  The patient complains of abdominal gas and bloating.  The patient denies any nausea or vomiting.  The patient denies any gastroesophageal reflux disease or dysphagia.  The patient complains of occasional atypical chest pain but denies any atypical chest pain, shortness of breath or palpitations.  The chest pain is described as a pressure, intermittent occasional right sided chest discomfort that is nonradiating in nature.  The patient denies any diaphoresis. The chest pain is described as 4 out of 10 in intensity.  The chest pain can occur at any time.  The chest pain is worse at night and early morning.  The chest pain is worse after meals and improves with passing gas.  The chest pain is worse with stress.  The chest pain has never awakened the patient from sleep.  The patient denies any constipation or diarrhea.  The patient has 1 bowel movement a day. The patient denies a change in bowel habits.  The patient denies a change in caliber of stool.  The patient denies having mucus discharge with the bowel movements.  The patient denies any bright red blood per rectum, melena or hematemesis.  The patient denies any rectal pain or rectal pruritus.  The patient complains of fluctuating weight anorexia but denies any anorexia.  The patient previously complained of weight loss but denied any anorexia. The patient admitted to losing 9 pounds over 4 months. She attributed to weight loss to recent change in diet. She denies any fevers or chills. The MRI of the liver with and without IV contrast performed on May 15, 2019 revealed a 1.1 cm hepatic dome lesion likely an atypical hemangioma which corresponds to the findings on recent abdominal ultrasound. Also noted was mild diffuse hepatic steatosis a normal gallbladder. The MRI of the abdomen with IV contrast performed on March 1, 2021 revealed mild to moderate steatosis with an estimated that fraction of 14%. Also noted was a stable 1.1 cm hemangioma in segment 7 at the dome. The abdominal ultrasound performed on October 6, 2020 revealed hepatomegaly with hepatic cysts and diffuse hepatic steatosis. There were no additional abnormal findings. The CAT scan of the abdomen and pelvis performed on May 5, 2009 revealed a probable corpus luteal cyst in the left ovary with associated hemoperitoneum and no evidence of bowel obstruction or pneumoperitoneum and no evidence of appendicitis. The blood test performed on November 29, 2021 revealed no evidence of anemia with a hemoglobin/hematocrit level of 11.5/37.4, respectively, a normal alpha-fetoprotein level of <1.8 ng/mL, a low CO2 of 20 mmol/L, normal liver enzymes with a total bilirubin of 0.2 mg/dL, and normal alkaline phosphatase/AST/ALT/GGTP of 87/22/26/33 U/L, respectively, a normal serum iron level of 52 mcg/dL, a normal TIBC/U IBC of 377/325 mcg/dL, respectively, a normal iron percent saturation of 14%, a low ferritin level of 14 ng/mL, an elevated total cholesterol/triglyceride level of 225/262 mg/dL, respectively, a normal amylase/lipase level of 87/31 U/L, respectively, and a normal ESR of 15 mm/h. The patient had a prior history of anemia secondary to heavy menstrual cycles. She was previously followed by her gynecologist, Dr. Escalante. The patient was treated with iron supplementation with improvement of the anemia. The patient was previously followed by a hematologist, Dr. Osiel Parry. She was treated with IV iron supplementation and later switched to PO iron supplementation. The patient admits to having a prior colonoscopy approximately 10 years ago by Dr. Wai Bobby. According to the patient, the colonoscopy was unremarkable. The patient admits to having a prior colonoscopy performed by another gastroenterologist. According to the patient, the colonoscopic findings revealed diverticulosis and internal hemorrhoids. The patient admits to a family history of GI problems. The patient’s father had a history of fatty liver.  [de-identified] : (-) smoking, (-) ETOH, (-) IVDA\par

## 2022-08-31 NOTE — ASSESSMENT
[FreeTextEntry1] : Dyspepsia: The patient complains of dyspeptic symptoms.  The patient was advised to continue to abide by an anti-gas (low FOD-MAP) diet.  The patient was previously given a pamphlet for anti-gas (low FOD-MAP).  The patient and I reviewed the anti-gas (low FOD-MAP)diet at length again. The patient is to continue on a trial of Simethicone one tablet 4 times a day p.r.n. abdominal pain and gas.\par Atypical Chest Pain: The patient complains of atypical chest pain of unclear etiology.  The patient was advised to follow up with the PMD and cardiologist regarding evaluation for the atypical chest pain. The patient was told of possible etiologies such as cardiac, pulmonary, GI, musculoskeletal, stress and other causes for the atypical chest pain.  The patient agrees and will follow-up with the PMD and cardiologist. \par Abnormal Imaging Study: The MRI of the liver with and without IV contrast performed on May 15, 2019 revealed a 1.1 cm hepatic dome lesion likely an atypical hemangioma which corresponds to the findings on prior abdominal ultrasound. Also noted was mild diffuse hepatic steatosis a normal gallbladder. The MRI of the abdomen with IV contrast performed on March 1, 2021 revealed mild to moderate steatosis with an estimated that fraction of 14%. Also noted was a stable 1.1 cm hemangioma in segment 7 at the dome. The abdominal ultrasound performed on October 6, 2020 revealed hepatomegaly with hepatic cysts and diffuse hepatic steatosis. There were no additional abnormal findings. The CAT scan of the abdomen and pelvis performed on May 5, 2009 revealed a probable corpus luteal cyst in the left ovary with associated hemoperitoneum and no evidence of bowel obstruction or pneumoperitoneum and no evidence of appendicitis. I recommend a repeat MRI of the liver with and without IV contrast when the patient returns to the office to assess the stability of the right hepatic dome lesion pending abdominal ultrasound results. The patient agreed and will followup. \par History of Elevated Liver Enzymes: The blood work performed on September 23, 2020 revealed an elevated ESR of 24 mm/hr, elevated liver enzymes with an AST/ALT/GGTP of 114/184/348 U/L, respectively, an elevated iron level of 186 ug/dl, an elevated cholesterol/triglyceride level of 209/196 mg/dl, respectively, and elevated ferritin level of 187 ng/mL, a reactive hepatitis A IgG antibody. The COVID 19 IgG antibody was positive with an index of 132.00. The blood work performed on January 25, 2021 revealed normalization of the liver enzymes. \par the total bilirubin was 0.3 mg/dL, the alkaline phosphatase/AST/ALT/GGTP were 92/29/34/30 U/L, respectively. I recommend repeat LFTs to reassess the liver. I will try to obtain the recent blood work from the patient's PMD. \par Fatty Liver: The patient had an imaging study suggestive of fatty infiltration of the liver. The patient denies any jaundice or pruritus. The patient denies any alcohol use. The patient denies taking large doses of nonsteroidal anti-inflammatory drugs or acetaminophen. The findings are suggestive of fatty liver. The patient and I had a long discussion regarding the risks of fatty liver and possible progression to cirrhosis. The patient was told of the possible increased risk of developing liver failure, cirrhosis, ascites, GI bleeding secondary to varices, hepatic encephalopathy, bleeding tendencies and liver cancer. The patient was told of the importance of follow-up. The patient was advised to follow up every 6 months for blood work and imaging studies. The patient agreed and will follow up. The patient was advised to lose weight. I recommend a trial of vitamin E supplementation for the fatty liver. If the liver enzymes remain elevated, the patient may require a trial of Pioglitazone for the fatty liver. I recommend avoid alcohol and hepato-toxic agents. The patient was also advised to avoid NSAIDs, Acetaminophen and any other hepatotoxic drugs. The patient was also advised not to share needles, razors, scissors, nail clippers, etc.. The patient is to continue close follow-up in our office for blood work and exams. If the liver enzymes remain elevated, the patient may require a CT guided liver biopsy to assess the liver parenchyma for possible treatment. We had a long discussion regarding the risks and benefits of the procedure. The patient was told of the risks of bleeding, perforation, infections, emergency surgery and missing lesions. The patient agreed and will follow-up to reassess the symptoms. \par Blood Work: I recommend blood work to assess the patient's symptoms. I recommend a CBC, SMA 24, amylase, lipase, ESR, TFTs, ,iron, TIBC, ferritin level and lipid profile. I also recommend obtaining the recent blood work performed by the patient's PMD.\par Imaging Study: I recommend an imaging study to assess the symptoms. I recommend a repeat abdominal ultrasound to reassess the liver parenchyma and for liver lesions and assess stability of hemangioma.\par Follow-up: The patient is to follow-up in the office in 6 months to reassess the symptoms. The patient was told to call the office if any further problems. \par

## 2022-09-01 ENCOUNTER — NON-APPOINTMENT (OUTPATIENT)
Age: 42
End: 2022-09-01

## 2022-09-01 LAB
25(OH)D3 SERPL-MCNC: 28.3 NG/ML
AFP-TM SERPL-MCNC: <1.8 NG/ML
ALBUMIN SERPL ELPH-MCNC: 4.8 G/DL
ALP BLD-CCNC: 87 U/L
ALT SERPL-CCNC: 19 U/L
AMYLASE/CREAT SERPL: 119 U/L
ANION GAP SERPL CALC-SCNC: 15 MMOL/L
AST SERPL-CCNC: 18 U/L
BASOPHILS # BLD AUTO: 0.08 K/UL
BASOPHILS NFR BLD AUTO: 1.3 %
BILIRUB SERPL-MCNC: 0.2 MG/DL
BUN SERPL-MCNC: 16 MG/DL
CALCIUM SERPL-MCNC: 9.7 MG/DL
CHLORIDE SERPL-SCNC: 106 MMOL/L
CHOLEST SERPL-MCNC: 215 MG/DL
CO2 SERPL-SCNC: 20 MMOL/L
CREAT SERPL-MCNC: 0.68 MG/DL
EGFR: 110 ML/MIN/1.73M2
EOSINOPHIL # BLD AUTO: 0.48 K/UL
EOSINOPHIL NFR BLD AUTO: 8 %
ERYTHROCYTE [SEDIMENTATION RATE] IN BLOOD BY WESTERGREN METHOD: 28 MM/HR
FERRITIN SERPL-MCNC: 7 NG/ML
GGT SERPL-CCNC: 23 U/L
GLIADIN IGA SER QL: 12.4 UNITS
GLIADIN IGG SER QL: <5 UNITS
GLIADIN PEPTIDE IGA SER-ACNC: NEGATIVE
GLIADIN PEPTIDE IGG SER-ACNC: NEGATIVE
GLUCOSE SERPL-MCNC: 98 MG/DL
HCT VFR BLD CALC: 33.6 %
HDLC SERPL-MCNC: 43 MG/DL
HGB BLD-MCNC: 10.1 G/DL
IGA SER QL IEP: 125 MG/DL
IMM GRANULOCYTES NFR BLD AUTO: 0.3 %
IRON SATN MFR SERPL: 4 %
IRON SERPL-MCNC: 19 UG/DL
LDLC SERPL CALC-MCNC: 145 MG/DL
LPL SERPL-CCNC: 62 U/L
LYMPHOCYTES # BLD AUTO: 1.87 K/UL
LYMPHOCYTES NFR BLD AUTO: 31.2 %
MAN DIFF?: NORMAL
MCHC RBC-ENTMCNC: 22.7 PG
MCHC RBC-ENTMCNC: 30.1 GM/DL
MCV RBC AUTO: 75.7 FL
MONOCYTES # BLD AUTO: 0.44 K/UL
MONOCYTES NFR BLD AUTO: 7.3 %
NEUTROPHILS # BLD AUTO: 3.1 K/UL
NEUTROPHILS NFR BLD AUTO: 51.9 %
NONHDLC SERPL-MCNC: 172 MG/DL
PLATELET # BLD AUTO: 275 K/UL
POTASSIUM SERPL-SCNC: 4.6 MMOL/L
PROT SERPL-MCNC: 7.6 G/DL
RBC # BLD: 4.44 M/UL
RBC # FLD: 17.7 %
RHEUMATOID FACT SER QL: <10 IU/ML
SODIUM SERPL-SCNC: 140 MMOL/L
TIBC SERPL-MCNC: 467 UG/DL
TRIGL SERPL-MCNC: 132 MG/DL
TSH SERPL-ACNC: 1.86 UIU/ML
TTG IGA SER IA-ACNC: <1.2 U/ML
TTG IGA SER-ACNC: NEGATIVE
TTG IGG SER IA-ACNC: 1.5 U/ML
TTG IGG SER IA-ACNC: NEGATIVE
UIBC SERPL-MCNC: 448 UG/DL
WBC # FLD AUTO: 5.99 K/UL

## 2022-09-02 LAB — ANA SER IF-ACNC: NEGATIVE

## 2023-01-09 ENCOUNTER — NON-APPOINTMENT (OUTPATIENT)
Age: 43
End: 2023-01-09

## 2023-01-18 ENCOUNTER — NON-APPOINTMENT (OUTPATIENT)
Age: 43
End: 2023-01-18

## 2023-01-19 ENCOUNTER — NON-APPOINTMENT (OUTPATIENT)
Age: 43
End: 2023-01-19

## 2023-01-20 ENCOUNTER — NON-APPOINTMENT (OUTPATIENT)
Age: 43
End: 2023-01-20

## 2023-08-14 ENCOUNTER — APPOINTMENT (OUTPATIENT)
Dept: GASTROENTEROLOGY | Facility: CLINIC | Age: 43
End: 2023-08-14
Payer: COMMERCIAL

## 2023-08-14 VITALS
OXYGEN SATURATION: 99 % | DIASTOLIC BLOOD PRESSURE: 73 MMHG | HEART RATE: 65 BPM | HEIGHT: 61 IN | SYSTOLIC BLOOD PRESSURE: 110 MMHG | TEMPERATURE: 97.2 F | BODY MASS INDEX: 27.75 KG/M2 | WEIGHT: 147 LBS

## 2023-08-14 DIAGNOSIS — Z12.11 ENCOUNTER FOR SCREENING FOR MALIGNANT NEOPLASM OF COLON: ICD-10-CM

## 2023-08-14 PROCEDURE — 99214 OFFICE O/P EST MOD 30 MIN: CPT

## 2023-08-14 RX ORDER — SODIUM PICOSULFATE, MAGNESIUM OXIDE, AND ANHYDROUS CITRIC ACID 12; 3.5; 1 G/175ML; G/175ML; MG/175ML
10-3.5-12 MG-GM LIQUID ORAL TWICE DAILY
Qty: 2 | Refills: 0 | Status: ACTIVE | COMMUNITY
Start: 2023-08-14 | End: 1900-01-01

## 2023-08-15 RX ORDER — POLYETHYLENE GLYCOL 3350 AND ELECTROLYTES WITH LEMON FLAVOR 236; 22.74; 6.74; 5.86; 2.97 G/4L; G/4L; G/4L; G/4L; G/4L
236 POWDER, FOR SOLUTION ORAL
Qty: 1 | Refills: 0 | Status: ACTIVE | COMMUNITY
Start: 2023-08-15 | End: 1900-01-01

## 2023-08-15 NOTE — PHYSICAL EXAM
[Well Developed] : well developed [Well Nourished] : well nourished [Sclera] : the sclera and conjunctiva were normal [Hearing Threshold Finger Rub Not [place]] : hearing was normal [Normal Appearance] : the appearance of the neck was normal [No Acc Muscle Use] : no accessory muscle use [Respiration, Rhythm And Depth] : normal respiratory rhythm and effort [Auscultation Breath Sounds / Voice Sounds] : lungs were clear to auscultation bilaterally [Heart Rate And Rhythm] : heart rate was normal and rhythm regular [Normal S1, S2] : normal S1 and S2 [Murmurs] : no murmurs [Heart Sounds Gallop] : no gallops [No Rubs] : no pericardial rub [Bowel Sounds] : normal bowel sounds [Abdomen Tenderness] : non-tender [No Masses] : no abdominal mass palpated [Abdomen Soft] : soft [No CVA Tenderness] : no CVA  tenderness [Abnormal Walk] : normal gait [Involuntary Movements] : no involuntary movements were seen [Motor Tone] : muscle strength and tone were normal [Normal Color / Pigmentation] : normal skin color and pigmentation [] : no rash [Normal Turgor] : normal skin turgor [Sensation] : the sensory exam was normal to light touch and pinprick [No Focal Deficits] : no focal deficits [Motor Exam] : the motor exam was normal [Normal] : oriented to person, place, and time [Normal Affect] : the affect was normal [de-identified] : overweight [de-identified] : FROM in all extremities [de-identified] : no masses

## 2023-08-15 NOTE — HISTORY OF PRESENT ILLNESS
[de-identified] : The CAT scan of the abdomen and pelvis performed on May 5, 2009 revealed a probable corpus luteal cyst in the left ovary with associated hemoperitoneum and no evidence of bowel obstruction or pneumoperitoneum and no evidence of appendicitis.  [FreeTextEntry1] : The MRI of the abdomen with IV contrast performed on March 1, 2021 revealed mild to moderate steatosis with an estimated that fraction of 14%. Also noted was a stable 1.1 cm hemangioma in segment 7 at the dome.   The MRI of the liver with and without IV contrast performed on May 15, 2019 revealed a 1.1 cm hepatic dome lesion likely an atypical hemangioma which corresponds to the findings on prior abdominal ultrasound. Also noted was mild diffuse hepatic steatosis a normal gallbladder. There were no additional abnormal findings.   [de-identified] : The abdominal ultrasound performed on January 7, 2023 revealed no change in mild hepatic steatosis noted since January 6, 2022, a 1.3 cm hypoechoic lesion in the right hepatic lobe characterized as a typical hemangioma on MRI performed on March 1, 2021 and a mildly increased size of a right hepatic cyst.   The abdominal ultrasound performed on October 6, 2020 revealed hepatomegaly with hepatic cysts and diffuse hepatic steatosis.

## 2023-08-15 NOTE — ASSESSMENT
[FreeTextEntry1] : Dyspepsia: The patient complains of dyspeptic symptoms.  The patient was advised to continue to abide by an anti-gas (low FOD-MAP) diet.  The patient was previously given a pamphlet for anti-gas (low FOD-MAP).  The patient and I reviewed the anti-gas (low FOD-MAP)diet at length again. The patient is to continue on a trial of Simethicone one tablet 4 times a day p.r.n. abdominal pain and gas. Abnormal Imaging Study: The MRI of the liver with and without IV contrast performed on May 15, 2019 revealed a 1.1 cm hepatic dome lesion likely an atypical hemangioma which corresponds to the findings on prior abdominal ultrasound. Also noted was mild diffuse hepatic steatosis a normal gallbladder. The MRI of the abdomen with IV contrast performed on March 1, 2021 revealed mild to moderate steatosis with an estimated that fraction of 14%. Also noted was a stable 1.1 cm hemangioma in segment 7 at the dome. The abdominal ultrasound performed on October 6, 2020 revealed hepatomegaly with hepatic cysts and diffuse hepatic steatosis. There were no additional abnormal findings. The CAT scan of the abdomen and pelvis performed on May 5, 2009 revealed a probable corpus luteal cyst in the left ovary with associated hemoperitoneum and no evidence of bowel obstruction or pneumoperitoneum and no evidence of appendicitis. I recommend a repeat MRI of the liver with and without IV contrast when the patient returns to the office to assess the stability of the right hepatic dome lesion pending abdominal ultrasound results. The patient agreed and will followup.  History of Elevated Liver Enzymes: The blood work performed on September 23, 2020 revealed an elevated ESR of 24 mm/hr, elevated liver enzymes with an AST/ALT/GGTP of 114/184/348 U/L, respectively, an elevated iron level of 186 ug/dl, an elevated cholesterol/triglyceride level of 209/196 mg/dl, respectively, and elevated ferritin level of 187 ng/mL, a reactive hepatitis A IgG antibody. The COVID 19 IgG antibody was positive with an index of 132.00. The blood work performed on January 25, 2021 revealed normalization of the liver enzymes.  the total bilirubin was 0.3 mg/dL, the alkaline phosphatase/AST/ALT/GGTP were 92/29/34/30 U/L, respectively. I recommend repeat LFTs to reassess the liver in 6 months. I will try to obtain the recent blood work from the patient's PMD.  Fatty Liver: The patient had an imaging study suggestive of fatty infiltration of the liver. The patient denies any jaundice or pruritus. The patient denies any alcohol use. The patient denies taking large doses of nonsteroidal anti-inflammatory drugs or acetaminophen. The findings are suggestive of fatty liver. The patient and I had a long discussion regarding the risks of fatty liver and possible progression to cirrhosis. The patient was told of the possible increased risk of developing liver failure, cirrhosis, ascites, GI bleeding secondary to varices, hepatic encephalopathy, bleeding tendencies and liver cancer. The patient was told of the importance of follow-up. The patient was advised to follow up every 6 months for blood work and imaging studies. The patient agreed and will follow up. The patient was advised to lose weight. I recommend a trial of vitamin E supplementation for the fatty liver. If the liver enzymes remain elevated, the patient may require a trial of Pioglitazone for the fatty liver. I recommend avoid alcohol and hepato-toxic agents. The patient was also advised to avoid NSAIDs, Acetaminophen and any other hepatotoxic drugs. The patient was also advised not to share needles, razors, scissors, nail clippers, etc.. The patient is to continue close follow-up in our office for blood work and exams. If the liver enzymes remain elevated, the patient may require a CT guided liver biopsy to assess the liver parenchyma for possible treatment. We had a long discussion regarding the risks and benefits of the procedure. The patient was told of the risks of bleeding, perforation, infections, emergency surgery and missing lesions. The patient agreed and will follow-up to reassess the symptoms.  Colon Cancer screening: I recommend colonoscopy for colon cancer screening over the age of 45 to assess for colonic polyps. The patient was told of the risks and benefits of the procedure.  The patient was told of the risks of perforation, emergency surgery, bleeding, infections and missed lesions. The patient is to be on a clear liquid diet the entire day prior to the procedure. The patient is to complete the entire prescribed bowel prep the day prior to the procedure as directed. The patient is told not to drive, drink alcohol, use recreational drugs, exercise, or work the day of the procedure.  The patient was told of the need for an escort to accompany the patient home after the procedure. The patient is aware that the procedure may be cancelled if they fail to follow the directions.  The patient agreed and will schedule for the procedure. The patient is to be n.p.o. after midnight and bowel prep was given.  The patient is to return for the procedure. Follow-up: The patient is to follow-up in the office in 6 months to reassess the symptoms. The patient was told to call the office if any further problems.

## 2023-12-04 RX ORDER — POLYETHYLENE GLYCOL 3350 AND ELECTROLYTES WITH LEMON FLAVOR 236; 22.74; 6.74; 5.86; 2.97 G/4L; G/4L; G/4L; G/4L; G/4L
236 POWDER, FOR SOLUTION ORAL
Qty: 1 | Refills: 0 | Status: ACTIVE | COMMUNITY
Start: 2023-12-04 | End: 1900-01-01

## 2023-12-05 ENCOUNTER — TRANSCRIPTION ENCOUNTER (OUTPATIENT)
Age: 43
End: 2023-12-05

## 2023-12-05 ENCOUNTER — OUTPATIENT (OUTPATIENT)
Dept: OUTPATIENT SERVICES | Facility: HOSPITAL | Age: 43
LOS: 1 days | End: 2023-12-05
Payer: COMMERCIAL

## 2023-12-05 ENCOUNTER — APPOINTMENT (OUTPATIENT)
Dept: GASTROENTEROLOGY | Facility: HOSPITAL | Age: 43
End: 2023-12-05

## 2023-12-05 VITALS
WEIGHT: 145.95 LBS | HEIGHT: 61 IN | TEMPERATURE: 98 F | SYSTOLIC BLOOD PRESSURE: 108 MMHG | RESPIRATION RATE: 18 BRPM | DIASTOLIC BLOOD PRESSURE: 87 MMHG | HEART RATE: 70 BPM | OXYGEN SATURATION: 100 %

## 2023-12-05 VITALS
HEART RATE: 60 BPM | SYSTOLIC BLOOD PRESSURE: 110 MMHG | OXYGEN SATURATION: 100 % | RESPIRATION RATE: 15 BRPM | DIASTOLIC BLOOD PRESSURE: 75 MMHG

## 2023-12-05 DIAGNOSIS — D21.9 BENIGN NEOPLASM OF CONNECTIVE AND OTHER SOFT TISSUE, UNSPECIFIED: Chronic | ICD-10-CM

## 2023-12-05 DIAGNOSIS — Z12.11 ENCOUNTER FOR SCREENING FOR MALIGNANT NEOPLASM OF COLON: ICD-10-CM

## 2023-12-05 LAB
HCG UR QL: NEGATIVE — SIGNIFICANT CHANGE UP
HCG UR QL: NEGATIVE — SIGNIFICANT CHANGE UP

## 2023-12-05 PROCEDURE — G0121 COLON CA SCRN NOT HI RSK IND: CPT

## 2023-12-05 PROCEDURE — G0121: CPT

## 2023-12-05 PROCEDURE — 81025 URINE PREGNANCY TEST: CPT

## 2023-12-05 RX ORDER — SODIUM CHLORIDE 9 MG/ML
500 INJECTION INTRAMUSCULAR; INTRAVENOUS; SUBCUTANEOUS
Refills: 0 | Status: COMPLETED | OUTPATIENT
Start: 2023-12-05 | End: 2023-12-05

## 2023-12-05 RX ADMIN — SODIUM CHLORIDE 30 MILLILITER(S): 9 INJECTION INTRAMUSCULAR; INTRAVENOUS; SUBCUTANEOUS at 13:54

## 2023-12-05 NOTE — ASU PREOP CHECKLIST - WARM FLUIDS/WARM BLANKETS
Arciniega present for colposcopy    Tessa Herrmann MD PGY4  OB/Gyn  Tricia Arciniega MD         no

## 2023-12-05 NOTE — ASU PATIENT PROFILE, ADULT - WHEN WAS YOUR LAST VACCINATION? YEAR
2022 Rhofade Pregnancy And Lactation Text: This medication has not been assigned a Pregnancy Risk Category. It is unknown if the medication is excreted in breast milk.

## 2023-12-05 NOTE — ASU PATIENT PROFILE, ADULT - FALL HARM RISK - UNIVERSAL INTERVENTIONS
Bed in lowest position, wheels locked, appropriate side rails in place/Call bell, personal items and telephone in reach/Instruct patient to call for assistance before getting out of bed or chair/Non-slip footwear when patient is out of bed/[place] to call system/Physically safe environment - no spills, clutter or unnecessary equipment/Purposeful Proactive Rounding/Room/bathroom lighting operational, light cord in reach Bed in lowest position, wheels locked, appropriate side rails in place/Call bell, personal items and telephone in reach/Instruct patient to call for assistance before getting out of bed or chair/Non-slip footwear when patient is out of bed/[place] to call system/Physically safe environment - no spills, clutter or unnecessary equipment/Purposeful Proactive Rounding/Room/bathroom lighting operational, light cord in reach

## 2023-12-05 NOTE — ASU PREOP CHECKLIST - BOWEL PREP
Admitting History and Physical





- Primary Care Physician


PCP: Klarissa Ibarra





- Admission


Chief Complaint: BENZODIAZAPINE WITHDRAWEL


History of Present Illness: 





68 yo M with hx of anxiety, HTN, Parkinson's presents to ED with headache, 

tremors, and insomnia.  Patient's sons are at bedside and state that the 

patient recently came from [place] and had his medications changed by a new PCP.

  Patient was previously taking risperidone, memantine, bromazepam, and atenolol

, but per sons the doctor discontinued the bromazepam and atenolol.  Patient 

complains of headache "like a swelling" to the back of his head, and sons 

report that patient is "seeing things that aren't there."  Family reports that 

other than the visual hallucinations, patient has been acting normally and 

speaking normally. At baseline patient does not walk "due to knee problems, he 

uses crutches and has a wheelchair" per family. 





History Source: Medical Record


Limitations to Obtaining History: Physical Impairment, Poor Historian





- Smoking History


Smoking history: Never smoked





- Alcohol/Substance Use


Hx Alcohol Use: No





Home Medications





- Allergies


Allergies/Adverse Reactions: 


 Allergies











Allergy/AdvReac Type Severity Reaction Status Date / Time


 


No Known Allergies Allergy   Verified 12/18/17 16:51














- Home Medications


Home Medications: 


Ambulatory Orders





Memantine HCl 10 mg PO BID 12/18/17 


Risperidone 1 mg PO BID 12/18/17 











Review of Systems





- Review of Systems


Constitutional: reports: Weakness


Eyes: reports: No Symptoms


HENT: reports: No Symptoms


Neck: reports: No Symptoms


Cardiovascular: reports: No Symptoms


Respiratory: reports: No Symptoms


Gastrointestinal: reports: No Symptoms


Genitourinary: reports: No Symptoms


Musculoskeletal: reports: Muscle Weakness


Integumentary: reports: No Symptoms


Neurological: reports: Pre-Existing Deficit, Unsteady Gait, Weakness


Endocrine: reports: No Symptoms


Hematology/Lymphatic: reports: No Symptoms


Psychiatric: reports: No Symptoms





Physical Examination


Vital Signs: 


 Vital Signs











Temperature  97.8 F   12/19/17 09:28


 


Pulse Rate  56 L  12/19/17 09:28


 


Respiratory Rate  18   12/19/17 09:28


 


Blood Pressure  142/79   12/19/17 09:28


 


O2 Sat by Pulse Oximetry (%)  96   12/19/17 02:19











Constitutional: Yes: No Distress


Eyes: Yes: WNL


HENT: Yes: WNL


Neck: Yes: WNL


Cardiovascular: Yes: WNL


Respiratory: Yes: WNL


Gastrointestinal: Yes: WNL


Renal/: Yes: WNL


Musculoskeletal: Yes: Muscle Weakness


Extremities: Yes: WNL


Edema: No


Peripheral Pulses WNL: Yes


Integumentary: Yes: WNL


Wound/Incision: Yes: Clean/Dry


Neurological: Yes: Pre-Existing Deficit, Unsteady Gait, Weakness


...Motor Strength: LLE, RLE


Psychiatric: Yes: Other


Labs: 


 CBC, BMP





 12/18/17 17:00 





 12/18/17 17:00 











Imaging





- Results


Cat Scan: Report Reviewed





Problem List





- Problems


(1) Parkinson disease


Code(s): G20 - PARKINSON'S DISEASE   





(2) Benzodiazepine withdrawal


Code(s): F13.239 - SEDATV/HYP/ANXIOLYTC DEPENDENCE W WITHDRAWAL, UNSP   


Qualifiers: 


   Complication of substance-induced condition: with unspecified complication   

Qualified Code(s): F13.239 - Sedative, hypnotic or anxiolytic dependence with 

withdrawal, unspecified   





Assessment/Plan





IVF


NEUROLOGY EVAL


PT EVAL


F/U AS OUTPATINET


DC PLANNING done

## 2023-12-05 NOTE — ASU DISCHARGE PLAN (ADULT/PEDIATRIC) - NS MD DC FALL RISK RISK
For information on Fall & Injury Prevention, visit: [email or web address] OR  [email or web address] OR  https://www.cdc.gov/steadi/patient.html For information on Fall & Injury Prevention, visit: [email or web address] OR  [email or web address] OR  https://www.cdc.gov/steadi/patient.html

## 2023-12-07 PROBLEM — Z78.9 OTHER SPECIFIED HEALTH STATUS: Chronic | Status: ACTIVE | Noted: 2023-12-05

## 2024-01-22 ENCOUNTER — APPOINTMENT (OUTPATIENT)
Dept: ENDOCRINOLOGY | Facility: CLINIC | Age: 44
End: 2024-01-22
Payer: COMMERCIAL

## 2024-01-22 VITALS
OXYGEN SATURATION: 98 % | HEART RATE: 89 BPM | BODY MASS INDEX: 28.89 KG/M2 | SYSTOLIC BLOOD PRESSURE: 127 MMHG | WEIGHT: 153 LBS | TEMPERATURE: 98 F | RESPIRATION RATE: 16 BRPM | DIASTOLIC BLOOD PRESSURE: 79 MMHG | HEIGHT: 61 IN

## 2024-01-22 PROCEDURE — 99214 OFFICE O/P EST MOD 30 MIN: CPT

## 2024-01-22 NOTE — HISTORY OF PRESENT ILLNESS
[FreeTextEntry1] : Patient feels well, her weight has increased. The last US thyroid disclosed small nodule. Her mother has thyroid cancer. Denies any trouble swallowing or brathing

## 2024-01-22 NOTE — ASSESSMENT
[FreeTextEntry1] : Patient is clinically euthyroid No recent blood tests Her weight is stable Will order new thyroid function tests before next visit Will order a new US thyroid before next visit The patient will call me in 3 to 4 weeks for resutls

## 2024-01-22 NOTE — PHYSICAL EXAM
[Alert] : alert [No Acute Distress] : no acute distress [Thyroid Not Enlarged] : the thyroid was not enlarged [No Neck Mass] : no neck mass was observed [No Respiratory Distress] : no respiratory distress [Clear to Auscultation] : lungs were clear to auscultation bilaterally [Normal Rate] : heart rate was normal [Normal PMI] : the apical impulse was normal [No Edema] : no peripheral edema

## 2024-02-01 ENCOUNTER — NON-APPOINTMENT (OUTPATIENT)
Age: 44
End: 2024-02-01

## 2024-02-08 LAB
ANION GAP SERPL CALC-SCNC: 12 MMOL/L
BUN SERPL-MCNC: 18 MG/DL
CALCIUM SERPL-MCNC: 9.2 MG/DL
CHLORIDE SERPL-SCNC: 104 MMOL/L
CHOLEST SERPL-MCNC: 215 MG/DL
CO2 SERPL-SCNC: 21 MMOL/L
CREAT SERPL-MCNC: 0.69 MG/DL
EGFR: 109 ML/MIN/1.73M2
GLUCOSE SERPL-MCNC: 99 MG/DL
HDLC SERPL-MCNC: 41 MG/DL
LDLC SERPL CALC-MCNC: 140 MG/DL
NONHDLC SERPL-MCNC: 174 MG/DL
POTASSIUM SERPL-SCNC: 4.1 MMOL/L
SODIUM SERPL-SCNC: 137 MMOL/L
T4 FREE SERPL-MCNC: 1.1 NG/DL
THYROPEROXIDASE AB SERPL IA-ACNC: 246 IU/ML
TRIGL SERPL-MCNC: 189 MG/DL
TSH SERPL-ACNC: 3.24 UIU/ML

## 2024-03-03 ENCOUNTER — EMERGENCY (EMERGENCY)
Facility: HOSPITAL | Age: 44
LOS: 1 days | Discharge: HOME | End: 2024-03-03
Attending: EMERGENCY MEDICINE
Payer: COMMERCIAL

## 2024-03-03 VITALS
HEART RATE: 85 BPM | OXYGEN SATURATION: 98 % | HEIGHT: 61 IN | DIASTOLIC BLOOD PRESSURE: 81 MMHG | SYSTOLIC BLOOD PRESSURE: 127 MMHG | WEIGHT: 152.12 LBS | RESPIRATION RATE: 18 BRPM | TEMPERATURE: 98 F

## 2024-03-03 VITALS
HEART RATE: 86 BPM | OXYGEN SATURATION: 97 % | SYSTOLIC BLOOD PRESSURE: 111 MMHG | DIASTOLIC BLOOD PRESSURE: 71 MMHG | RESPIRATION RATE: 18 BRPM

## 2024-03-03 DIAGNOSIS — D21.9 BENIGN NEOPLASM OF CONNECTIVE AND OTHER SOFT TISSUE, UNSPECIFIED: Chronic | ICD-10-CM

## 2024-03-03 LAB
ALBUMIN SERPL ELPH-MCNC: 4.1 G/DL — SIGNIFICANT CHANGE UP (ref 3.5–5)
ALP SERPL-CCNC: 79 U/L — SIGNIFICANT CHANGE UP (ref 40–120)
ALT FLD-CCNC: 22 U/L DA — SIGNIFICANT CHANGE UP (ref 10–60)
ANION GAP SERPL CALC-SCNC: 10 MMOL/L — SIGNIFICANT CHANGE UP (ref 5–17)
AST SERPL-CCNC: 18 U/L — SIGNIFICANT CHANGE UP (ref 10–40)
BILIRUB SERPL-MCNC: 0.3 MG/DL — SIGNIFICANT CHANGE UP (ref 0.2–1.2)
BUN SERPL-MCNC: 13 MG/DL — SIGNIFICANT CHANGE UP (ref 7–18)
CALCIUM SERPL-MCNC: 9.2 MG/DL — SIGNIFICANT CHANGE UP (ref 8.4–10.5)
CHLORIDE SERPL-SCNC: 106 MMOL/L — SIGNIFICANT CHANGE UP (ref 96–108)
CO2 SERPL-SCNC: 23 MMOL/L — SIGNIFICANT CHANGE UP (ref 22–31)
CREAT SERPL-MCNC: 0.79 MG/DL — SIGNIFICANT CHANGE UP (ref 0.5–1.3)
EGFR: 94 ML/MIN/1.73M2 — SIGNIFICANT CHANGE UP
GLUCOSE SERPL-MCNC: 119 MG/DL — HIGH (ref 70–99)
HCG SERPL-ACNC: <1 MIU/ML — SIGNIFICANT CHANGE UP
HCT VFR BLD CALC: 36.1 % — SIGNIFICANT CHANGE UP (ref 34.5–45)
HGB BLD-MCNC: 11.4 G/DL — LOW (ref 11.5–15.5)
LACTATE SERPL-SCNC: 1.7 MMOL/L — SIGNIFICANT CHANGE UP (ref 0.7–2)
MCHC RBC-ENTMCNC: 24.9 PG — LOW (ref 27–34)
MCHC RBC-ENTMCNC: 31.6 GM/DL — LOW (ref 32–36)
MCV RBC AUTO: 79 FL — LOW (ref 80–100)
NRBC # BLD: 0 /100 WBCS — SIGNIFICANT CHANGE UP (ref 0–0)
PLATELET # BLD AUTO: 245 K/UL — SIGNIFICANT CHANGE UP (ref 150–400)
POTASSIUM SERPL-MCNC: 3.9 MMOL/L — SIGNIFICANT CHANGE UP (ref 3.5–5.3)
POTASSIUM SERPL-SCNC: 3.9 MMOL/L — SIGNIFICANT CHANGE UP (ref 3.5–5.3)
PROT SERPL-MCNC: 8.1 G/DL — SIGNIFICANT CHANGE UP (ref 6–8.3)
RBC # BLD: 4.57 M/UL — SIGNIFICANT CHANGE UP (ref 3.8–5.2)
RBC # FLD: 14.9 % — HIGH (ref 10.3–14.5)
SODIUM SERPL-SCNC: 139 MMOL/L — SIGNIFICANT CHANGE UP (ref 135–145)
TROPONIN I, HIGH SENSITIVITY RESULT: 6.1 NG/L — SIGNIFICANT CHANGE UP
WBC # BLD: 13.43 K/UL — HIGH (ref 3.8–10.5)
WBC # FLD AUTO: 13.43 K/UL — HIGH (ref 3.8–10.5)

## 2024-03-03 PROCEDURE — 73030 X-RAY EXAM OF SHOULDER: CPT | Mod: 26,LT

## 2024-03-03 PROCEDURE — 84702 CHORIONIC GONADOTROPIN TEST: CPT

## 2024-03-03 PROCEDURE — 80053 COMPREHEN METABOLIC PANEL: CPT

## 2024-03-03 PROCEDURE — 82962 GLUCOSE BLOOD TEST: CPT

## 2024-03-03 PROCEDURE — 96374 THER/PROPH/DIAG INJ IV PUSH: CPT

## 2024-03-03 PROCEDURE — 93005 ELECTROCARDIOGRAM TRACING: CPT

## 2024-03-03 PROCEDURE — 73030 X-RAY EXAM OF SHOULDER: CPT

## 2024-03-03 PROCEDURE — 85027 COMPLETE CBC AUTOMATED: CPT

## 2024-03-03 PROCEDURE — 99285 EMERGENCY DEPT VISIT HI MDM: CPT

## 2024-03-03 PROCEDURE — 84484 ASSAY OF TROPONIN QUANT: CPT

## 2024-03-03 PROCEDURE — 99285 EMERGENCY DEPT VISIT HI MDM: CPT | Mod: 25

## 2024-03-03 PROCEDURE — 71045 X-RAY EXAM CHEST 1 VIEW: CPT | Mod: 26

## 2024-03-03 PROCEDURE — 83605 ASSAY OF LACTIC ACID: CPT

## 2024-03-03 PROCEDURE — 71045 X-RAY EXAM CHEST 1 VIEW: CPT

## 2024-03-03 PROCEDURE — 36415 COLL VENOUS BLD VENIPUNCTURE: CPT

## 2024-03-03 RX ORDER — KETOROLAC TROMETHAMINE 30 MG/ML
15 SYRINGE (ML) INJECTION ONCE
Refills: 0 | Status: DISCONTINUED | OUTPATIENT
Start: 2024-03-03 | End: 2024-03-03

## 2024-03-03 RX ADMIN — Medication 15 MILLIGRAM(S): at 04:38

## 2024-03-03 NOTE — ED PROVIDER NOTE - CLINICAL SUMMARY MEDICAL DECISION MAKING FREE TEXT BOX
43-year-old female with what sounds like vasovagal syncope given that there were precipitating symptoms including flushing/warmth.  Plan for fingerstick, EKG, labs, meds, reassess

## 2024-03-03 NOTE — ED PROVIDER NOTE - PATIENT PORTAL LINK FT
You can access the FollowMyHealth Patient Portal offered by [facility] by registering at the following website: [email or web address]. By joining [facility]’s FollowMyHealth portal, you will also be able to view your health information using other applications (apps) compatible with our system.

## 2024-03-03 NOTE — ED PROVIDER NOTE - PHYSICAL EXAMINATION
Yes
GENERAL: well appearing, no acute distress   HEAD: atraumatic   EYES: EOMI   ENT: moist oral mucosa   CARDIAC: regular rate, Extremities warm and well-perfused  RESPIRATORY: no increased work of breathing, Bilateral breath sounds present, lungs clear to auscultation bilaterally  ABDO: soft, non tender, non distended  MUSCULOSKELETAL: no deformity, Mild posterior tenderness to left shoulder but full nonpainful range of motion  NEUROLOGICAL: AAOx3, CN's II-XII intact, strength 5/5 bilateral UE and LE, sensation intact to light touch, finger to nose intact, steady gait   SKIN: no visible rash  PSYCHIATRIC: cooperative

## 2024-03-03 NOTE — ED PROVIDER NOTE - PROGRESS NOTE DETAILS
EKG on my interpretation normal sinus rhythm, rate 82, QTc 450, no ischemic changes, no ectopy Labs with WBCs 13 but otherwise nonactionable  Chest x-ray without pneumothorax or fracture  Repeat neuroexam intact  DC with supportive care and outpatient follow-up.  Discussed indication for patient return to ED.  Patient understood.

## 2024-03-03 NOTE — ED ADULT TRIAGE NOTE - CHIEF COMPLAINT QUOTE
pt reports that  she woke up to go to bathroom  she passed out and fell in the floor  for  about 2 minutes  about 1 hour ago, LOC for2 minutes ,  does not know head strike  no significant medical history

## 2024-03-03 NOTE — ED PROVIDER NOTE - OBJECTIVE STATEMENT
43-year-old female denies past medical history presents for evaluation status post episode of syncope.  Patient states she woke up and urinated and then had facial flushing and ended up on the floor.  Cannot provide details of this event.   states that the toilet was cracked as a result of patient's syncope/fall.  Currently complaining of mild left shoulder pain.  Was out earlier today at [facility] and had a few alcoholic drinks but this was not intoxicated tonight. Denies other acute complaints.

## 2024-03-05 ENCOUNTER — APPOINTMENT (OUTPATIENT)
Dept: OTOLARYNGOLOGY | Facility: CLINIC | Age: 44
End: 2024-03-05
Payer: COMMERCIAL

## 2024-03-05 VITALS
WEIGHT: 153 LBS | BODY MASS INDEX: 28.89 KG/M2 | DIASTOLIC BLOOD PRESSURE: 77 MMHG | HEIGHT: 61 IN | HEART RATE: 76 BPM | SYSTOLIC BLOOD PRESSURE: 116 MMHG

## 2024-03-05 DIAGNOSIS — E04.1 NONTOXIC SINGLE THYROID NODULE: ICD-10-CM

## 2024-03-05 PROCEDURE — 99203 OFFICE O/P NEW LOW 30 MIN: CPT

## 2024-03-05 NOTE — CONSULT LETTER
[Dear  ___] : Dear  [unfilled], [Consult Letter:] : I had the pleasure of evaluating your patient, [unfilled]. [Please see my note below.] : Please see my note below. [Consult Closing:] : Thank you very much for allowing me to participate in the care of this patient.  If you have any questions, please do not hesitate to contact me. [Sincerely,] : Sincerely, [FreeTextEntry3] : Ever Barakat MD, FACS     [facility] Associate Chair    Department of Otolaryngology  Professor Otolaryngology & Molecular Medicine [facility] [FreeTextEntry2] : Taiwo Stein MD ( [place], [place])

## 2024-06-19 ENCOUNTER — APPOINTMENT (OUTPATIENT)
Dept: GASTROENTEROLOGY | Facility: CLINIC | Age: 44
End: 2024-06-19
Payer: COMMERCIAL

## 2024-06-19 VITALS
TEMPERATURE: 97.4 F | BODY MASS INDEX: 28.51 KG/M2 | SYSTOLIC BLOOD PRESSURE: 113 MMHG | OXYGEN SATURATION: 98 % | HEIGHT: 61 IN | HEART RATE: 65 BPM | WEIGHT: 151 LBS | DIASTOLIC BLOOD PRESSURE: 75 MMHG

## 2024-06-19 DIAGNOSIS — K76.0 FATTY (CHANGE OF) LIVER, NOT ELSEWHERE CLASSIFIED: ICD-10-CM

## 2024-06-19 DIAGNOSIS — E53.8 DEFICIENCY OF OTHER SPECIFIED B GROUP VITAMINS: ICD-10-CM

## 2024-06-19 DIAGNOSIS — R93.2 ABNORMAL FINDINGS ON DIAGNOSTIC IMAGING OF LIVER AND BILIARY TRACT: ICD-10-CM

## 2024-06-19 DIAGNOSIS — Z86.2 PERSONAL HISTORY OF DISEASES OF THE BLOOD AND BLOOD-FORMING ORGANS AND CERTAIN DISORDERS INVOLVING THE IMMUNE MECHANISM: ICD-10-CM

## 2024-06-19 DIAGNOSIS — R10.13 EPIGASTRIC PAIN: ICD-10-CM

## 2024-06-19 DIAGNOSIS — K21.9 GASTRO-ESOPHAGEAL REFLUX DISEASE W/OUT ESOPHAGITIS: ICD-10-CM

## 2024-06-19 PROCEDURE — 99214 OFFICE O/P EST MOD 30 MIN: CPT

## 2024-06-19 RX ORDER — FAMOTIDINE 40 MG/1
40 TABLET, FILM COATED ORAL
Qty: 60 | Refills: 3 | Status: ACTIVE | OUTPATIENT
Start: 2020-09-23

## 2024-06-19 RX ORDER — SIMETHICONE 125 MG/1
125 TABLET, CHEWABLE ORAL
Qty: 120 | Refills: 3 | Status: ACTIVE | COMMUNITY
Start: 2023-08-14 | End: 1900-01-01

## 2024-06-19 NOTE — ASSESSMENT
[FreeTextEntry1] : Dyspepsia: The patient complains of dyspeptic symptoms.  The patient was advised to continue to abide by an anti-gas (low FOD-MAP) diet.  The patient was previously given a pamphlet for anti-gas (low FOD-MAP).  The patient and I reviewed the anti-gas (low FOD-MAP) diet at length again. The patient is to continue on a trial of Simethicone one tablet 4 times a day p.r.n. abdominal pain and gas. GERD: The patient was advised to avoid late-night meals and dietary indiscretions.  The patient was advised to avoid fried and fatty foods.  The patient was advised to abide by an anti-GERD diet. The patient was given a pamphlet for anti-GERD.  The patient and I reviewed the anti-GERD diet at length. I recommend a trial of famotidine 40 mg twice a day x 3 months for the symptoms. Vitamin B12 deficiency: According to the patient, the patient has vitamin B12 deficiency of unclear etiology.  She denies any diarrhea or pancreatic disease.  I recommend obtaining the old blood work to assess for vitamin B-12 deficiency.  I also recommend repeat blood work to reassess for vitamin B12 deficiency and other causes of anemia.  I recommend an upper endoscopy to assess for atrophic gastritis versus other causes of vitamin B12 deficiency. Upper Endoscopy: I recommend an upper endoscopy to assess for peptic ulcer disease versus esophagitis.  The patient was told of the risks and benefits of the procedure.  The patient was told of the risks of perforation, emergency surgery, bleeding, infections and missed lesions. The patient is told not to drive, drink alcohol, use recreational drugs, exercise, or work the day of the procedure.  The patient was told of the need for an escort to accompany the patient home after the procedure. The patient is aware that the procedure may be cancelled if they fail to follow the directions.  The patient agreed and will schedule for the procedure. The patient is to be n.p.o. after midnight.  The patient is to return for the procedure. Internal Hemorrhoids: The patient is to consider a trial of Anusol H. C. suppositories one per rectum nightly and Anusol HC2 .5% cream apply to affected area twice a day p.r.n. hemorrhoidal bleeding or pain. I also recommend a trial of Calmoseptine cream apply to affected area twice a day for hemorrhoidal discomfort.  I recommend Tucks pads for the hemorrhoids.  I recommend Sitz baths twice a day for the hemorrhoids.  I recommend avoid wearing tight underwear and use boxers.  I recommend avoid touching the perineal area.  The patient agreed to followup.  I recommend a repeat colonoscopy in 10 years to reassess for colonic polyps unless symptomatic.  The patient agreed and will follow up for the procedure.  Abnormal Imaging Study: The MRI of the liver with and without IV contrast performed on May 15, 2019 revealed a 1.1 cm hepatic dome lesion likely an atypical hemangioma which corresponds to the findings on prior abdominal ultrasound. Also noted was mild diffuse hepatic steatosis a normal gallbladder. The MRI of the abdomen with IV contrast performed on March 1, 2021 revealed mild to moderate steatosis with an estimated that fraction of 14%. Also noted was a stable 1.1 cm hemangioma in segment 7 at the dome. The abdominal ultrasound performed on October 6, 2020 revealed hepatomegaly with hepatic cysts and diffuse hepatic steatosis. There were no additional abnormal findings. The CAT scan of the abdomen and pelvis performed on May 5, 2009 revealed a probable corpus luteal cyst in the left ovary with associated hemoperitoneum and no evidence of bowel obstruction or pneumoperitoneum and no evidence of appendicitis. I recommend a repeat MRI of the liver with and without IV contrast when the patient returns to the office to assess the stability of the right hepatic dome lesion pending abdominal ultrasound results. The patient agreed and will followup. History of Elevated Liver Enzymes: The blood work performed on September 23, 2020 revealed an elevated ESR of 24 mm/hr, elevated liver enzymes with an AST/ALT/GGTP of 114/184/348 U/L, respectively, an elevated iron level of 186 ug/dl, an elevated cholesterol/triglyceride level of 209/196 mg/dl, respectively, and elevated ferritin level of 187 ng/mL, a reactive hepatitis A IgG antibody. The COVID 19 IgG antibody was positive with an index of 132.00. The blood work performed on January 25, 2021 revealed normalization of the liver enzymes. the total bilirubin was 0.3 mg/dL, the alkaline phosphatase/AST/ALT/GGTP were 92/29/34/30 U/L, respectively. I recommend repeat LFTs to reassess the liver in 6 months. I will try to obtain the recent blood work from the patient's PMD. Fatty Liver: The patient had an imaging study suggestive of fatty infiltration of the liver. The patient denies any jaundice or pruritus. The patient denies any alcohol use. The patient denies taking large doses of nonsteroidal anti-inflammatory drugs or acetaminophen. The findings are suggestive of fatty liver. The patient and I had a long discussion regarding the risks of fatty liver and possible progression to cirrhosis. The patient was told of the possible increased risk of developing liver failure, cirrhosis, ascites, GI bleeding secondary to varices, hepatic encephalopathy, bleeding tendencies and liver cancer. The patient was told of the importance of follow-up. The patient was advised to follow up every 6 months for blood work and imaging studies. The patient agreed and will follow up. The patient was advised to lose weight. I recommend a trial of vitamin E supplementation for the fatty liver. If the liver enzymes remain elevated, the patient may require a trial of Pioglitazone for the fatty liver. I recommend avoid alcohol and hepato-toxic agents. The patient was also advised to avoid NSAIDs, Acetaminophen and any other hepatotoxic drugs. The patient was also advised not to share needles, razors, scissors, nail clippers, etc.. The patient is to continue close follow-up in our office for blood work and exams. If the liver enzymes remain elevated, the patient may require a CT guided liver biopsy to assess the liver parenchyma for possible treatment. We had a long discussion regarding the risks and benefits of the procedure. The patient was told of the risks of bleeding, perforation, infections, emergency surgery and missing lesions. The patient agreed and will follow-up to reassess the symptoms. Follow-up: The patient is to follow-up in the office in 1 month to reassess the symptoms. The patient was told to call the office if any further problems.

## 2024-06-19 NOTE — HISTORY OF PRESENT ILLNESS
[de-identified] : The CAT scan of the abdomen and pelvis performed on May 5, 2009 revealed a probable corpus luteal cyst in the left ovary with associated hemoperitoneum and no evidence of bowel obstruction or pneumoperitoneum and no evidence of appendicitis.  [FreeTextEntry1] : The MRI of the abdomen with IV contrast performed on March 1, 2021 revealed mild to moderate steatosis with an estimated that fraction of 14%. Also noted was a stable 1.1 cm hemangioma in segment 7 at the dome.   The MRI of the liver with and without IV contrast performed on May 15, 2019 revealed a 1.1 cm hepatic dome lesion likely an atypical hemangioma which corresponds to the findings on prior abdominal ultrasound. Also noted was mild diffuse hepatic steatosis a normal gallbladder. There were no additional abnormal findings.   [de-identified] : The abdominal ultrasound performed on January 7, 2023 revealed no change in mild hepatic steatosis noted since January 6, 2022, a 1.3 cm hypoechoic lesion in the right hepatic lobe characterized as a typical hemangioma on MRI performed on March 1, 2021 and a mildly increased size of a right hepatic cyst.   The abdominal ultrasound performed on October 6, 2020 revealed hepatomegaly with hepatic cysts and diffuse hepatic steatosis.

## 2024-07-10 NOTE — ASU PATIENT PROFILE, ADULT - BLOOD TRANSFUSION, PREVIOUS, PROFILE
[facility], [place]    Hematology / Oncology Progress Note    Date of Admission: 10/23/2017  Hospital Day #: 5   Date of Service (when I saw the patient): 10/28/2017     Assessment & Plan   George Fish is a 69 year old male with PMHx significant for Parkinson's disease, DM2, CKD, GERD, and CMML (on decitabine), who was recently hospitalized for suspected aspiration pneumonia from 10/14-10/20/17 and discharged to TCU on levofloxacin planned through 10/28/17. Now presenting with fever to 101.4F despite scheduled Tylenol, found to have stable labs and reassuring vital signs, though CXR concerning for increased opacity in the RLL suggestive of aspiration versus infectious consolidation.    ID   #Suspected pneumonia (HCAP vs aspiration).  Recent hospitalization for treatment of pneumonia 10/14-10/20/17, with etiology felt to be aspiration given findings by SLP, though not typical aspiration pattern on CT chest 10/17/17. Discharged on oral Levaquin through 10/28/17. Now presenting with fevers to 101.4F (while on scheduled Tylenol). Vitals are reassuring, and he is not hypoxic. Labs reveal no significant leukocytosis and lactate is normal. CXR concerning for increasing RLL opacity, suggestive of aspiration vs infectious consolidation. Started on vancomycin and Zosyn in the ED.   -Continue Zosyn IV, discontinue IV Vancomycin.-->Now transitioned off IV to Augmentin to complete a 14 day antibiotic course (~ through 11/6).  -BCx drawn in ED, NGTD.  -RVP negative.  -Encourage incentive spirometry.    #Anti-infectives:  -Continue acyclovir.     MSK  #Left sided chest pain.  Pain in left chest over last several days. Worse with deep breathing, cough and palpation. Suspect musculoskeletal in nature. Low suspicion for cardiac etiology.  - EKG with sinus tachycardia and QTc 383.  - Tylenol PRN.     GI   #Oropharyngeal dysphagia.  Recent video swallow during prior hospitalization with significant  "aspiration, likely secondary to Parkinson's disease and deconditioning. Now s/p NJ placement on 10/17/17 (replaced on 10/20/17 due to accidental removal).   - Meds are to be given through NJ--->Patient elected removal of NJ and to take medications in pill form, will transition per his request.  - NPO per previous SLP assessment. Discussed about repeat consult but do not feel a repeat video swallow study this early will demonstrate much change. Will follow for exercise and evaluation. Per discussion with SLP, will initiate recs post-video swallow regarding diet: \"Safest recommendation is NPO with alternate nutrition source. Pt may tolerate small amounts of nectar-thick liquids for pleasure, however, cannot rule out risk of aspiration of residuals.\"  - Address goals-of-care as appropriate--brief discussion regarding desire to eat and risk of aspiration vs NPO w/ tube feed nutrition. Palliative care consulted, GOC/care conference held 10/27 to facilitate discussion of patients goals and further care options. Palliative to follow.  -Today patient elects a regular diet. He understands and vocalizes the risks and wishes to proceed with transitioning his diet.  - Pt expressing frustration with plan to go to facility, stating that he wants to go home. Paged SW and called wife to facilitate ongoing goals of care. From medical standpoint, pt is able to discharge today.    #Diarrhea.  Patient/patient's family reports a 3 day history of loose stools. Recent antibiotic courses as noted above. No abdominal pain or nausea. Likely 2/2 to tube feed initiation. Given recent hospitalization and abx use, will r/o C diff infection. Resolved.  - C.diff toxin PCR cancelled as patient has not had diarrhea since prior to admission.  - Enteric precautions discontinued.    HEME   #CMML.  Recently noted to have progressive symptoms (night sweats, early satiety, weight loss and increased fatigue) and started on decitabine in September. Has " "completed Cycle 1, with resolution of night sweats and improvement in energy overall. Has not yet started Cycle 2 due to recent hospitalizations.  - Uric acid normal, will discontinue allopurinol 10/27.  - Holding decitabine at this time.     #Anemia, thrombocytopenia.  Secondary to underlying malignancy and recent chemotherapy. Counts are stable and near baseline.  - Daily CBC.  - Conditional transfusions for Hgb >8 and platelets >10.     #Coagulopathy. Secondary to CMML. Previously evaluated by Dr. Chatterjee for significant bleeding with shoulder replacement surgery. At that time INR and PTT were both mildly prolonged with a borderline Factor V level (58%). It was felt that the most likely explanation for bleeding was acquired platelet dysfunction secondary to underlying CMML.  -Need platelets and amicar prior to any invasive procedure. Discuss with Dr. Chatterjee prior.    NEURO  #Parkinson's disease.  - Continue Sinemet as outline in Neurology note from 9/27/17.  - Current dose is 1/2 tab in the AM and 1/4 tab in the afternoon (Sinemet 25/100 mg tabs).-->As of 10/25 will start \"week 5\" of Neurology note dosing schedule: 1/2 tab in AM and afternoon, and 1/4 tab in evening.  -Neurology consulted, appreciate recs, recommend MRI w/o and w/ contrast. Patient declined MRI. Discussed with Neurology, they cannot definitively determine that dysphagia is 2/2 parkinson's alone (MRI was to rule out other alternative causes), however suspect dysphagia 2/2 to Parkinson's vs atypical parkinsonian syndrome. Recommend continuing Sinemet per Neurology note 9/27/17. Signed off.      #Intermittent incontinence. Patient with h/o occasional incontinence. States he is aware of his need to urinate. Did bring up the option of condom cath during kira/overnight. He said he would consider/think about this.    FEN  -Bolus fluids as needed.  -replace lytes per protocol  -Patient electing regular diet despite risks of aspiration, nectar thickened " liquids per SLP.    Prophys  -VTE: mechanical (d/t coagulopathy and TCP)  -Bowels: prn     Code: DNR/DNI    Dispo: Admission to inpatient status for worsening pneumonia. Discussion regarding GOC and further therapy, discharge location pending. Discharge pending discharge placement (possible TCU) vs home and improvement of acute issues.    -Pending discharge decisions, shedule OP follow up with Dr. Napoles ~ 1week post discharge and palliative care f/u appt.    Pt seen and discussed with Dr. Jessica Nielsen MD   Hematology / Oncology Fellow  P: [phone]      Interval History   DON overnight. Pt reports that he feels much better today. Energy is back and he is eating well. He describes wanting to change plan from facility to back to home. Wife coming this PM and he will discuss with her. Otherwise no new issues.     Physical Exam   Temp: 96.3  F (35.7  C) Temp src: Oral BP: 108/59 Pulse: 88 Heart Rate: 76 Resp: 20 SpO2: 96 % O2 Device: None (Room air)    Vitals:    10/26/17 0753 10/27/17 0719 10/28/17 0802   Weight: 78.5 kg (173 lb) 75.9 kg (167 lb 4.8 oz) 74.5 kg (164 lb 3.2 oz)     Vital Signs with Ranges  Temp:  [96.3  F (35.7  C)-99  F (37.2  C)] 96.3  F (35.7  C)  Pulse:  [88] 88  Heart Rate:  [76-84] 76  Resp:  [18-22] 20  BP: (101-125)/(53-69) 108/59  SpO2:  [93 %-97 %] 96 %  I/O last 3 completed shifts:  In: 340 [P.O.:340]  Out: 1070 [Urine:1070]    Gen: alert, pleasant and conversational elderly gentleman, NAD  HEENT: NC/AT, EOMI, anicteric sclera. MMM.   CV: normal S1,S2 with RRR no m/r/g  Resp: lungs CTA bilaterally with adequate air movement to bases. No wheezes or crackles  Abd: soft NTND no organomegaly or masses. BS normoactive.   Ext: WWP no edema or cyanosis  Skin: no concerning lesions or rashes  Neuro: A&Ox4, masked facies, slow movements. No resting tremor. Moves all extremities symmetrically.     Medications   Current Facility-Administered Medications   Medication     acyclovir  (ZOVIRAX) tablet 400 mg     amoxicillin-clavulanate (AUGMENTIN) 875-125 MG per tablet 1 tablet     pantoprazole (PROTONIX) EC tablet 40 mg     QUEtiapine (SEROquel) tablet 25 mg     mirtazapine (REMERON SOL-TAB) ODT tab 15 mg     acetaminophen (TYLENOL) solution 650 mg     dextrose 10 % 1,000 mL infusion     guaiFENesin (ROBITUSSIN) 20 mg/mL solution 10 mL     artificial saliva (BIOTENE MT) solution 2 spray     carbidopa-levodopa (SINEMET) half-tab 12.5-50 mg     ondansetron (ZOFRAN-ODT) ODT tab 4 mg     prochlorperazine (COMPAZINE) tablet 5 mg     Medication Instruction     LORazepam (ATIVAN) injection 0.5 mg     carbidopa-levodopa (SINEMET) half-tab 12.5-50 mg     carbidopa-levodopa (SINEMET) quarter-tab 6.25-25 mg       Data   CBC    Recent Labs  Lab 10/28/17  0628 10/27/17  0609 10/26/17  0626 10/25/17  0520   WBC 7.5 6.9 6.7 5.7   RBC 3.89* 3.73* 3.29* 3.35*   HGB 9.6* 9.2* 8.0* 8.2*   HCT 31.7* 30.2* 26.8* 27.4*   MCV 82 81 82 82   MCH 24.7* 24.7* 24.3* 24.5*   MCHC 30.3* 30.5* 29.9* 29.9*   RDW 23.0* 23.1* 23.9* 24.0*   PLT 48* 39* 54* 50*     CMP    Recent Labs  Lab 10/28/17  0628 10/27/17  0609 10/26/17  0626 10/25/17  0520 10/24/17  0610  10/23/17    137 138 141 140  < > 140   POTASSIUM 3.7 3.9 3.9 3.9 4.0  < > 4.0   CHLORIDE 103 102 106 107 107  < > 105   CO2 23 23 25 25 27  < > 26   ANIONGAP 11 11 7 8 7  < > 9   * 87 184* 206* 104*  < > 221*   BUN 24 21 21 25 27  < > 29   CR 1.20 1.24 1.27* 1.38* 1.40*  < > 1.28*   GFRESTIMATED 60* 57* 56* 51* 50*  < > 55*   GFRESTBLACK 72 69 67 61 60*  < > 67   ODALIS 8.9 9.4 8.9 8.7 9.1  < > 9.6   MAG  --   --   --  2.2 2.3  --   --    PHOS  --   --   --  3.7 6.0*  --   --    PROTTOTAL  --   --   --   --   --   --  7.8   ALBUMIN  --   --   --   --   --   --  3.0*   BILITOTAL  --   --   --   --   --   --  0.7   ALKPHOS  --   --   --   --   --   --  79   AST  --   --   --   --   --   --  59*   ALT  --   --   --   --   --   --  25   < > = values in this interval  not displayed.  INRNo lab results found in last 7 days.    Results for orders placed or performed during the hospital encounter of 10/23/17   XR Chest 2 Views    Narrative    Exam:  Chest X-ray 10/23/2017 1:31 PM    History: recent PNA and now fever    Comparison: Chest x-ray on October 20, 2017    Findings: AP and lateral view of the chest. There is increasing  opacity in the right lower lung field, concerning for recurrent  infective consolidation/aspiration. Enteric tube passes below the  diaphragm with the tip outside the field-of-view.  Worsening patchy  perihilar opacity.  Cardiac size within normal limits. No acute bony  abnormalities.      Impression    Impression:   Increasing opacity in the right lower lung field concerning for  aspiration/infective consolidation.     I have personally reviewed the examination and initial interpretation  and I agree with the findings.    MARCELLA GAN MD   CT Maxillofacial w/o contrast    Narrative    CT MAXILLOFACIAL W/O CONTRAST 10/24/2017 1:34 PM    Provided History: evaluate sinus cavities, r/o infection     Comparison: Head CT 5/11/2017     Technique:  Using thin collimation multidetector helical acquisition  technique, axial, coronal, and sagittal thin section CT images were  reconstructed through the paranasal sinuses. Images were reviewed in  bone and soft tissue windows.    Findings:   Partially visualized feeding tube in the right nasal cavity.    Maxillary sinuses: clear.  Sphenoid sinus: clear.  Frontal sinus: clear.  Ethmoid air cells: clear.    The ostiomeatal units appear patent bilaterally. The bony walls of the  paranasal sinuses are intact.    Normal retromaxillary and pterygopalatine fat.    The adenoid tonsils in the nasopharynx are unremarkable. Bilateral  pseudophakia. Intracranial calcific atherosclerosis. Unerupted  posterior most maxillary molars and posterior most left mandibular  molar.      Impression    Impression:  No evidence of  sinusitis.    I have personally reviewed the examination and initial interpretation  and I agree with the findings.    RIN GARCIA MD          no

## 2024-07-10 NOTE — ASU PATIENT PROFILE, ADULT - MENTAL HEALTH CONDITIONS/SYMPTOMS, PROFILE
Anxiety    BPH (benign prostatic hyperplasia)  TURP- 1/2019  CAD (coronary artery disease)  CABG x 2 - 2/2018  Cervical radiculopathy  as per family unstable C1, C2 , Dr. Decker notified , limited ROM of neck, pt is difficult intubation risk, family to bring results of MRI of neck 2018  Chronic neck pain    Chronic pain of both shoulders    H/O CHF    Hypercholesterolemia    Hypertension    Parkinsons disease  onset 2011  Scoliosis     none

## 2024-07-11 ENCOUNTER — OUTPATIENT (OUTPATIENT)
Dept: OUTPATIENT SERVICES | Facility: HOSPITAL | Age: 44
LOS: 1 days | End: 2024-07-11
Payer: COMMERCIAL

## 2024-07-11 ENCOUNTER — TRANSCRIPTION ENCOUNTER (OUTPATIENT)
Age: 44
End: 2024-07-11

## 2024-07-11 ENCOUNTER — APPOINTMENT (OUTPATIENT)
Dept: GASTROENTEROLOGY | Facility: HOSPITAL | Age: 44
End: 2024-07-11

## 2024-07-11 ENCOUNTER — RESULT REVIEW (OUTPATIENT)
Age: 44
End: 2024-07-11

## 2024-07-11 VITALS
SYSTOLIC BLOOD PRESSURE: 120 MMHG | WEIGHT: 149.91 LBS | HEART RATE: 61 BPM | DIASTOLIC BLOOD PRESSURE: 77 MMHG | RESPIRATION RATE: 14 BRPM | TEMPERATURE: 98 F | OXYGEN SATURATION: 100 % | HEIGHT: 61 IN

## 2024-07-11 VITALS
DIASTOLIC BLOOD PRESSURE: 76 MMHG | HEART RATE: 68 BPM | OXYGEN SATURATION: 100 % | SYSTOLIC BLOOD PRESSURE: 120 MMHG | RESPIRATION RATE: 14 BRPM

## 2024-07-11 DIAGNOSIS — E53.8 DEFICIENCY OF OTHER SPECIFIED B GROUP VITAMINS: ICD-10-CM

## 2024-07-11 DIAGNOSIS — Z98.890 OTHER SPECIFIED POSTPROCEDURAL STATES: Chronic | ICD-10-CM

## 2024-07-11 DIAGNOSIS — D21.9 BENIGN NEOPLASM OF CONNECTIVE AND OTHER SOFT TISSUE, UNSPECIFIED: Chronic | ICD-10-CM

## 2024-07-11 LAB — HCG UR QL: NEGATIVE — SIGNIFICANT CHANGE UP

## 2024-07-11 PROCEDURE — 43239 EGD BIOPSY SINGLE/MULTIPLE: CPT

## 2024-07-11 PROCEDURE — 81025 URINE PREGNANCY TEST: CPT

## 2024-07-11 PROCEDURE — 88312 SPECIAL STAINS GROUP 1: CPT

## 2024-07-11 PROCEDURE — 88305 TISSUE EXAM BY PATHOLOGIST: CPT

## 2024-07-11 PROCEDURE — 88305 TISSUE EXAM BY PATHOLOGIST: CPT | Mod: 26

## 2024-07-11 PROCEDURE — 88312 SPECIAL STAINS GROUP 1: CPT | Mod: 26

## 2024-07-11 DEVICE — ESOPHAGEAL BALLOON CATH CRE FIXED WIRE 8-9-10MM: Type: IMPLANTABLE DEVICE | Status: FUNCTIONAL

## 2024-07-11 RX ORDER — SODIUM CHLORIDE 0.9 % (FLUSH) 0.9 %
500 SYRINGE (ML) INJECTION
Refills: 0 | Status: COMPLETED | OUTPATIENT
Start: 2024-07-11 | End: 2024-07-11

## 2024-07-11 RX ADMIN — Medication 30 MILLILITER(S): at 10:37

## 2024-07-11 NOTE — ASU DISCHARGE PLAN (ADULT/PEDIATRIC) - NS MD DC FALL RISK RISK
For information on Fall & Injury Prevention, visit: [email or web address] OR  [email or web address] OR  https://www.cdc.gov/steadi/patient.html

## 2024-07-11 NOTE — CHART NOTE - NSCHARTNOTEFT_GEN_A_CORE
History of Present Illness       The patient is a 44-year-old  female with past medical history significant for hyperlipidemia and hepatic steatosis who was referred to my office by Dr. Cortney Hester for gastroesophageal reflux disease and dyspepsia. The patient also admits to having anemia. The patient denies any prior exposure to hepatitis A, B or C. The patient denies any large doses of nonsteroidal anti-inflammatory drugs or acetaminophen. The patient denies sharing needles, razors, nail clippers, nail files, scissors, et cetera. The patient denies any EtOH abuse, cocaine use or intravenous drug use. The patient denies any prior blood transfusions, sexual indiscretions, tattoos or piercing. The patient admits to having prior surgery. The history of surgery is significant for a prior D+C. The patient admits to a family history of GI or liver problems. The patient's father had a history of fatty liver. The patient states that she is feeling fine. The patient denies any jaundice or pruritus. The patient denies any chronic lower back pain. The patient denies any abdominal pain. The patient complains of abdominal gas and bloating. The patient denies any nausea or vomiting. The patient complains of occasional gastroesophageal reflux disease but denies any dysphagia. The gastroesophageal reflux disease is worse after meals and late at night and in the early morning. The gastroesophageal reflux disease is improved with proton pump inhibitors, H2 blockers and antacids. The patient denies any atypical chest pain, shortness of breath or palpitations. The patient denies any diaphoresis. The patient denies any constipation or diarrhea. The patient has 1 bowel movement a day. The patient denies a change in bowel habits. The patient denies a change in caliber of stool. The patient denies having mucus discharge with the bowel movements. The patient complains of occasional rectal bleeding but denies any melena or hematemesis. The rectal bleeding is associated with internal hemorrhoids. The patient denies any rectal pain or rectal pruritus. The patient denies any weight loss or anorexia. She denies any fevers or chills. The patient had a colonoscopy to the terminal ileum performed at the [facility] [place] GI endoscopy suite on December 5, 2023. The colonoscopy to the terminal ileum revealed a normal but very long and tortuous colon and small internal hemorrhoids. There were no polyps, masses, diverticulosis, AVMs or colitis noted. The patient tolerated the procedure well. The abdominal ultrasound performed on January 7, 2023 revealed no change in mild hepatic steatosis noted since January 6, 2022, a 1.3 cm hypoechoic lesion in the right hepatic lobe characterized as a typical hemangioma on MRI performed on March 1, 2021 and a mildly increased size of a right hepatic cyst. The blood work performed on August 21, 2022 revealed a normal amylase level of 119 U/L, and elevated lipase level of 62 U/L, a normal alpha-fetoprotein level of <1.8 ng/mL, anemia with a hemoglobin/hematocrit level of 10.1/33.6, respectively, a low CO2 of 20 mmol/L, normal liver enzymes with a total bilirubin of 0.2 mg/dL, a normal alkaline phosphatase/AST/ALT/GGTP, 87/18/19/23 U/L, respectively, a low serum iron level of 19 mcg/dL, a low TIBC/U IBC of 467/448 mcg/dL, respectively, a low iron percent saturation of 4%, a low ferritin level of 7 ng/mL, a negative transglutaminase IgG antibody EIA of 1.5 U/ml, a negative transglutaminase IgA antibody EIA of 1.2 U/ml, a negative Gliadin Deamidated IgG antibody of < 5.0 units, a negative Gliadin Deamidated IgA antibody of 12.4 units, a normal serum IgA level of 125 mg/dl, and elevated total cholesterol 215 mg/dL, a normal triglyceride level of 132 mg/dL, a normal rheumatoid factor <10 IU/mL, a low vitamin D level of 28.3 ng/mL, and elevated ESR of 28 mm/h, and a normal TSH of 1.86u IU/mL. The MRI of the abdomen with IV contrast performed on March 1, 2021 revealed mild to moderate steatosis with an estimated that fraction of 14%. Also noted was a stable 1.1 cm hemangioma in segment 7 at the dome. The MRI of the liver with and without IV contrast performed on May 15, 2019 revealed a 1.1 cm hepatic dome lesion likely an atypical hemangioma which corresponds to the findings on recent abdominal ultrasound. Also noted was mild diffuse hepatic steatosis a normal gallbladder. The abdominal ultrasound performed on October 6, 2020 revealed hepatomegaly with hepatic cysts and diffuse hepatic steatosis. There were no additional abnormal findings. The CAT scan of the abdomen and pelvis performed on May 5, 2009 revealed a probable corpus luteal cyst in the left ovary with associated hemoperitoneum and no evidence of bowel obstruction or pneumoperitoneum and no evidence of appendicitis. The blood test performed on November 29, 2021 revealed no evidence of anemia with a hemoglobin/hematocrit level of 11.5/37.4, respectively, a normal alpha-fetoprotein level of <1.8 ng/mL, a low CO2 of 20 mmol/L, normal liver enzymes with a total bilirubin of 0.2 mg/dL, and normal alkaline phosphatase/AST/ALT/GGTP of 87/22/26/33 U/L, respectively, a normal serum iron level of 52 mcg/dL, a normal TIBC/U IBC of 377/325 mcg/dL, respectively, a normal iron percent saturation of 14%, a low ferritin level of 14 ng/mL, an elevated total cholesterol/triglyceride level of 225/262 mg/dL, respectively, a normal amylase/lipase level of 87/31 U/L, respectively, and a normal ESR of 15 mm/h. The patient had a prior history of anemia secondary to heavy menstrual cycles. She was previously followed by her gynecologist, Dr. Escalante. The patient was treated with iron supplementation with improvement of the anemia. The patient was previously followed by a hematologist, Dr. Osiel Parry. She was treated with IV iron supplementation and later switched to PO iron supplementation. The patient admits to having a prior colonoscopy approximately 10 years ago by Dr. Wai Bobby. According to the patient, the colonoscopy was unremarkable. The patient admits to having a prior colonoscopy performed by another gastroenterologist. According to the patient, the colonoscopic findings revealed diverticulosis and internal hemorrhoids. The patient admits to a family history of GI problems. The patient's father had a history of fatty liver.  ?  (-) smoking, (-) ETOH, (-) IVDA  ?  Physical Exam:  General Appearance: Overweight, no acute distress  ENT: nose clear, ears unremarkable  Eyes: No enteric sclera, conjunctiva clear.  Neck: Supple, without masses  Respiratory: Breath sounds equal and bilateral, no wheezing no rales or rhonchi  Cardiovascular: S1-S2 audible, no murmur, no rubs or gallops  GI: (+) BS, soft, nontender, no rebound, no guarding, no masses  Liver: liver edge palpated  Musculo-skeletal: Good motor strength, good range of motion, normal appearing extremities  Skin: Normal appearing skin, no jaundice, no rashes or nodules  Neurological: without focal motor or sensory deficits Patient is moving all extremities spontaneously and to command with normal muscle strength alert and oriented X3  Psychiatric: Good affect, not depressed, not anxious  Gastroenterology Summary    Colonoscopy: The colonoscopy to the terminal ileum performed at the [facility] [place] GI endoscopy suite on December 5, 2023. The colonoscopy to the terminal ileum revealed a normal but very long and tortuous colon and small internal hemorrhoids. There were no polyps, masses, diverticulosis, AVMs or colitis noted.  Radiology Summary    CT: The CAT scan of the abdomen and pelvis performed on May 5, 2009 revealed a probable corpus luteal cyst in the left ovary with associated hemoperitoneum and no evidence of bowel obstruction or pneumoperitoneum and no evidence of appendicitis.    MRI: The MRI of the abdomen with IV contrast performed on March 1, 2021 revealed mild to moderate steatosis with an estimated that fraction of 14%. Also noted was a stable 1.1 cm hemangioma in segment 7 at the dome.  ?  The MRI of the liver with and without IV contrast performed on May 15, 2019 revealed a 1.1 cm hepatic dome lesion likely an atypical hemangioma which corresponds to the findings on prior abdominal ultrasound. Also noted was mild diffuse hepatic steatosis a normal gallbladder. There were no additional abnormal findings.    Abdominal Sono: The abdominal ultrasound performed on January 7, 2023 revealed no change in mild hepatic steatosis noted since January 6, 2022, a 1.3 cm hypoechoic lesion in the right hepatic lobe characterized as a typical hemangioma on MRI performed on March 1, 2021 and a mildly increased size of a right hepatic cyst.  The abdominal ultrasound performed on October 6, 2020 revealed hepatomegaly with hepatic cysts and diffuse hepatic steatosis.  ?  Active Problems  Abdominal pain, acute, right upper quadrant (789.01,338.19) (R10.11)  Abnormal finding on imaging of liver (793.3) (R93.2)  Atypical chest pain (786.59) (R07.89)  Colon cancer screening (V76.51) (Z12.11)  Dyspepsia (536.8) (R10.13)  Elevated liver enzymes (790.5) (R74.8)  Encounter for laboratory testing for COVID-19 virus (V01.79) (Z20.822)  Family planning (V25.09) (Z30.09)  Fatty liver (571.8) (K76.0)  GERD without esophagitis (530.81) (K21.9)  Hearing loss (389.9) (H91.90)  High cholesterol (272.0) (E78.00)  History of anemia (V12.3) (Z86.2)  Thyroid nodule (241.0) (E04.1)       ?  Past Medical History  History of Known health problems: none (V49.89) (Z78.9)       ?  Current Meds  Clenpiq 10-3.5-12 MG-GM -GM/175ML Oral Solution; TAKE 175 ML Twice daily  Dicyclomine HCl - 10 MG Oral Capsule; TAKE 1 CAPSULE 3 TIMES DAILY  Famotidine 40 MG Oral Tablet; TAKE 1 TABLET BY MOUTH TWICE A DAY  Meclizine HCl - 25 MG Oral Tablet; TAKE ONE TABLET BY MOUTH EVERY DAY AS  NEEDED FOR DIZZINESS FOR UP TO 14 DAYS  Minastrin 24 Fe 1-20 MG-MCG(24) CHEW; Take 1 tablet daily  PEG-3350/Electrolytes 236 GM Oral Solution Reconstituted; MIX AS DIRECTED AND  DRINK OVER 4 HOURS, START AT 4PM  PEG-3350/Electrolytes 236 GM Oral Solution Reconstituted; MIX AS DIRECTED AND  DRINK OVER 4 HOURS, START AT 4PM  Simethicone 125 MG Oral Tablet Chewable; CHEW AND SWALLOW 1 TABLET 4 TIMES  DAILY, AFTER MEALS AND AT BEDTIME       Allergies  No Known Drug Allergies  Fruit  Seasonal Allergies       ?  Vitals  Vital Signs  ?  Recorded: 19Jun2024 08:41AM  Systolic  113, LUE, Sitting  Diastolic  75, LUE, Sitting  Height  5 ft 1 in  Weight  151 lb  BMI Calculated  28.53 kg/m2  BSA Calculated  1.68 m2  Weight Comment  Stated  Temperature  97.4 F  Heart Rate  65  O2 Saturation  98 %, Room Air  FiO2 Flow Rate  0 L/min, Room Air       ?  Assessment  Vitamin B12 deficiency (266.2) (E53.8)  GERD without esophagitis (530.81) (K21.9)  Fatty liver (571.8) (K76.0)  Abnormal finding on imaging of liver (793.3) (R93.2)  Dyspepsia (536.8) (R10.13)  History of anemia (V12.3) (Z86.2)  Dyspepsia: The patient complains of dyspeptic symptoms. The patient was advised to continue to abide by an anti-gas (low FOD-MAP) diet. The patient was previously given a pamphlet for anti-gas (low FOD-MAP). The patient and I reviewed the anti-gas (low FOD-MAP) diet at length again. The patient is to continue on a trial of Simethicone one tablet 4 times a day p.r.n. abdominal pain and gas.  GERD: The patient was advised to avoid late-night meals and dietary indiscretions. The patient was advised to avoid fried and fatty foods. The patient was advised to abide by an anti-GERD diet. The patient was given a pamphlet for anti-GERD. The patient and I reviewed the anti-GERD diet at length. I recommend a trial of famotidine 40 mg twice a day x 3 months for the symptoms.  Vitamin B12 deficiency: According to the patient, the patient has vitamin B12 deficiency of unclear etiology. She denies any diarrhea or pancreatic disease. I recommend obtaining the old blood work to assess for vitamin B-12 deficiency. I also recommend repeat blood work to reassess for vitamin B12 deficiency and other causes of anemia. I recommend an upper endoscopy to assess for atrophic gastritis versus other causes of vitamin B12 deficiency.  Upper Endoscopy: I recommend an upper endoscopy to assess for peptic ulcer disease versus esophagitis. The patient was told of the risks and benefits of the procedure. The patient was told of the risks of perforation, emergency surgery, bleeding, infections and missed lesions. The patient is told not to drive, drink alcohol, use recreational drugs, exercise, or work the day of the procedure. The patient was told of the need for an escort to accompany the patient home after the procedure. The patient is aware that the procedure may be cancelled if they fail to follow the directions. The patient agreed and will schedule for the procedure. The patient is to be n.p.o. after midnight. The patient is to return for the procedure.  Internal Hemorrhoids: The patient is to consider a trial of Anusol H. C. suppositories one per rectum nightly and Anusol HC2.5% cream apply to affected area twice a day p.r.n. hemorrhoidal bleeding or pain. I also recommend a trial of Calmoseptine cream apply to affected area twice a day for hemorrhoidal discomfort. I recommend Tucks pads for the hemorrhoids. I recommend Sitz baths twice a day for the hemorrhoids. I recommend avoid wearing tight underwear and use boxers. I recommend avoid touching the perineal area. The patient agreed to followup.  I recommend a repeat colonoscopy in 10 years to reassess for colonic polyps unless symptomatic. The patient agreed and will follow up for the procedure.  Abnormal Imaging Study: The MRI of the liver with and without IV contrast performed on May 15, 2019 revealed a 1.1 cm hepatic dome lesion likely an atypical hemangioma which corresponds to the findings on prior abdominal ultrasound. Also noted was mild diffuse hepatic steatosis a normal gallbladder. The MRI of the abdomen with IV contrast performed on March 1, 2021 revealed mild to moderate steatosis with an estimated that fraction of 14%. Also noted was a stable 1.1 cm hemangioma in segment 7 at the dome. The abdominal ultrasound performed on October 6, 2020 revealed hepatomegaly with hepatic cysts and diffuse hepatic steatosis. There were no additional abnormal findings. The CAT scan of the abdomen and pelvis performed on May 5, 2009 revealed a probable corpus luteal cyst in the left ovary with associated hemoperitoneum and no evidence of bowel obstruction or pneumoperitoneum and no evidence of appendicitis. I recommend a repeat MRI of the liver with and without IV contrast when the patient returns to the office to assess the stability of the right hepatic dome lesion pending abdominal ultrasound results. The patient agreed and will followup.  History of Elevated Liver Enzymes: The blood work performed on September 23, 2020 revealed an elevated ESR of 24 mm/hr, elevated liver enzymes with an AST/ALT/GGTP of 114/184/348 U/L, respectively, an elevated iron level of 186 ug/dl, an elevated cholesterol/triglyceride level of 209/196 mg/dl, respectively, and elevated ferritin level of 187 ng/mL, a reactive hepatitis A IgG antibody. The COVID 19 IgG antibody was positive with an index of 132.00. The blood work performed on January 25, 2021 revealed normalization of the liver enzymes. the total bilirubin was 0.3 mg/dL, the alkaline phosphatase/AST/ALT/GGTP were 92/29/34/30 U/L, respectively. I recommend repeat LFTs to reassess the liver in 6 months. I will try to obtain the recent blood work from the patient's PMD.  Fatty Liver: The patient had an imaging study suggestive of fatty infiltration of the liver. The patient denies any jaundice or pruritus. The patient denies any alcohol use. The patient denies taking large doses of nonsteroidal anti-inflammatory drugs or acetaminophen. The findings are suggestive of fatty liver. The patient and I had a long discussion regarding the risks of fatty liver and possible progression to cirrhosis. The patient was told of the possible increased risk of developing liver failure, cirrhosis, ascites, GI bleeding secondary to varices, hepatic encephalopathy, bleeding tendencies and liver cancer. The patient was told of the importance of follow-up. The patient was advised to follow up every 6 months for blood work and imaging studies. The patient agreed and will follow up. The patient was advised to lose weight. I recommend a trial of vitamin E supplementation for the fatty liver. If the liver enzymes remain elevated, the patient may require a trial of Pioglitazone for the fatty liver. I recommend avoid alcohol and hepato-toxic agents. The patient was also advised to avoid NSAIDs, Acetaminophen and any other hepatotoxic drugs. The patient was also advised not to share needles, razors, scissors, nail clippers, etc.. The patient is to continue close follow-up in our office for blood work and exams. If the liver enzymes remain elevated, the patient may require a CT guided liver biopsy to assess the liver parenchyma for possible treatment. We had a long discussion regarding the risks and benefits of the procedure. The patient was told of the risks of bleeding, perforation, infections, emergency surgery and missing lesions. The patient agreed and will follow-up to reassess the symptoms.  Follow-up: The patient is to follow-up in the office in 1 month to reassess the symptoms. The patient was told to call the office if any further problems.          ?  Plan  Abdominal pain, acute, right upper quadrant  Renew: Famotidine 40 MG Oral Tablet; TAKE 1 TABLET BY MOUTH TWICE A DAY  Dyspepsia  Renew: Simethicone 125 MG Oral Tablet Chewable; CHEW AND SWALLOW 1 TABLET 4  TIMES DAILY, AFTER MEALS AND AT BEDTIME  Vitamin B12 deficiency  Upper GI Endoscopy; Status:Active; Requested for:19Jun2024;

## 2024-07-15 LAB — SURGICAL PATHOLOGY STUDY: SIGNIFICANT CHANGE UP

## 2024-08-20 ENCOUNTER — APPOINTMENT (OUTPATIENT)
Dept: ENDOCRINOLOGY | Facility: CLINIC | Age: 44
End: 2024-08-20
Payer: COMMERCIAL

## 2024-08-20 VITALS
OXYGEN SATURATION: 98 % | RESPIRATION RATE: 16 BRPM | TEMPERATURE: 98.4 F | SYSTOLIC BLOOD PRESSURE: 122 MMHG | BODY MASS INDEX: 29.45 KG/M2 | DIASTOLIC BLOOD PRESSURE: 77 MMHG | HEART RATE: 88 BPM | WEIGHT: 156 LBS | HEIGHT: 61 IN

## 2024-08-20 DIAGNOSIS — E04.1 NONTOXIC SINGLE THYROID NODULE: ICD-10-CM

## 2024-08-20 PROCEDURE — 99214 OFFICE O/P EST MOD 30 MIN: CPT

## 2024-08-20 PROCEDURE — G2211 COMPLEX E/M VISIT ADD ON: CPT | Mod: NC

## 2024-08-20 NOTE — HISTORY OF PRESENT ILLNESS
[FreeTextEntry1] : Patient is doing well, denies feeling tired, having cold intolerance, or palpitations. Denies dryness of the skin or hair loss. She denies chest pain or SOB. Denies any trouble swallowing or breathing.

## 2024-08-20 NOTE — DATA REVIEWED
[FreeTextEntry1] : Her new US thyroid revealed the nodule unchanged in size compared to the 1/24 report. In 2013 the nodules were smaller

## 2024-08-20 NOTE — ASSESSMENT
[FreeTextEntry1] : Patient is doing well She has gained valdo The latest US thyroid revealed the thyroid nodule unchanged Advised to follow the diet and exercise She has gained weight She sees Dr. Manzano for fatty liver

## 2024-08-27 ENCOUNTER — NON-APPOINTMENT (OUTPATIENT)
Age: 44
End: 2024-08-27

## 2024-08-28 ENCOUNTER — APPOINTMENT (OUTPATIENT)
Dept: GASTROENTEROLOGY | Facility: CLINIC | Age: 44
End: 2024-08-28
Payer: COMMERCIAL

## 2024-08-28 VITALS
DIASTOLIC BLOOD PRESSURE: 84 MMHG | OXYGEN SATURATION: 99 % | SYSTOLIC BLOOD PRESSURE: 125 MMHG | HEIGHT: 61 IN | TEMPERATURE: 98.2 F | BODY MASS INDEX: 29.45 KG/M2 | WEIGHT: 156 LBS | HEART RATE: 75 BPM

## 2024-08-28 DIAGNOSIS — Z86.2 PERSONAL HISTORY OF DISEASES OF THE BLOOD AND BLOOD-FORMING ORGANS AND CERTAIN DISORDERS INVOLVING THE IMMUNE MECHANISM: ICD-10-CM

## 2024-08-28 DIAGNOSIS — R74.8 ABNORMAL LEVELS OF OTHER SERUM ENZYMES: ICD-10-CM

## 2024-08-28 DIAGNOSIS — K76.0 FATTY (CHANGE OF) LIVER, NOT ELSEWHERE CLASSIFIED: ICD-10-CM

## 2024-08-28 DIAGNOSIS — E53.8 DEFICIENCY OF OTHER SPECIFIED B GROUP VITAMINS: ICD-10-CM

## 2024-08-28 DIAGNOSIS — R10.13 EPIGASTRIC PAIN: ICD-10-CM

## 2024-08-28 DIAGNOSIS — K21.9 GASTRO-ESOPHAGEAL REFLUX DISEASE W/OUT ESOPHAGITIS: ICD-10-CM

## 2024-08-28 PROCEDURE — 99214 OFFICE O/P EST MOD 30 MIN: CPT

## 2024-08-28 RX ORDER — PANTOPRAZOLE 40 MG/1
40 TABLET, DELAYED RELEASE ORAL DAILY
Qty: 30 | Refills: 2 | Status: ACTIVE | COMMUNITY
Start: 2024-08-28 | End: 1900-01-01

## 2024-08-28 NOTE — ASSESSMENT
[FreeTextEntry1] : Dyspepsia: The patient complains of dyspeptic symptoms.  The patient was advised to continue to abide by an anti-gas (low FOD-MAP) diet.  The patient was previously given a pamphlet for anti-gas (low FOD-MAP).  The patient and I reviewed the anti-gas (low FOD-MAP) diet at length again. The patient is to continue on a trial of Simethicone one tablet 4 times a day p.r.n. abdominal pain and gas. GERD: The patient was advised to avoid late-night meals and dietary indiscretions.  The patient was advised to avoid fried and fatty foods.  The patient was advised to abide by an anti-GERD diet. The patient was given a pamphlet for anti-GERD.  The patient and I reviewed the anti-GERD diet at length. I recommend a trial of Pantoprazole 40 mg once a day x 3 months for the symptoms. Constipation: The patient complains of constipation. I recommend a high-fiber diet. I recommend a trial of a probiotic such as Align once a day. I recommend a trial of Metamucil once a day for fiber supplementation.   The patient agreed and will followup to reassess the symptoms.   Gastritis: The patient has a history of gastritis noted on prior upper endoscopy. The patient is to avoid nonsteroidal anti-inflammatory drugs and aspirin. I recommend a trial of pantoprazole 40 mg once a day for 3 months for the symptoms.  Intestinal Metaplasia of the Stomach:  The patient was found to have intestinal metaplasia of the stomach on prior upper endoscopy.  The findings of intestinal metaplasia of the stomach have an increased risk of gastric cancer.  The biopsies did not reveal dysplasia.  I recommend a repeat upper endoscopy with mapping in 3 years to reassess for intestinal metaplasia and dysplasia unless symptomatic.  The patient is aware of the increased risk of intestinal metaplasia and progression to stomach cancer.  The patient agrees to follow-up. Vitamin B12 deficiency: According to the patient, the patient has vitamin B12 deficiency of unclear etiology. She denies any diarrhea or pancreatic disease. I recommend obtaining the old blood work to assess for vitamin B-12 deficiency. I also recommend repeat blood work to reassess for vitamin B12 deficiency and other causes of anemia. The upper endoscopy did not reveal atrophic gastritis or other causes of vitamin B12 deficiency.  I recommend repeat vitamin B12 and folate levels.  Internal Hemorrhoids: The patient is to consider a trial of Anusol H. C. suppositories one per rectum nightly and Anusol HC2.5% cream apply to affected area twice a day p.r.n. hemorrhoidal bleeding or pain. I also recommend a trial of Calmoseptine cream apply to affected area twice a day for hemorrhoidal discomfort. I recommend Tucks pads for the hemorrhoids. I recommend Sitz baths twice a day for the hemorrhoids. I recommend avoid wearing tight underwear and use boxers. I recommend avoid touching the perineal area. The patient agreed to followup. I recommend a repeat colonoscopy in 10 years to reassess for colonic polyps unless symptomatic. The patient agreed and will follow up for the procedure. Abnormal Imaging Study: The MRI of the liver with and without IV contrast performed on May 15, 2019 revealed a 1.1 cm hepatic dome lesion likely an atypical hemangioma which corresponds to the findings on prior abdominal ultrasound. Also noted was mild diffuse hepatic steatosis a normal gallbladder. The MRI of the abdomen with IV contrast performed on March 1, 2021 revealed mild to moderate steatosis with an estimated that fraction of 14%. Also noted was a stable 1.1 cm hemangioma in segment 7 at the dome. The abdominal ultrasound performed on October 6, 2020 revealed hepatomegaly with hepatic cysts and diffuse hepatic steatosis. There were no additional abnormal findings. The CAT scan of the abdomen and pelvis performed on May 5, 2009 revealed a probable corpus luteal cyst in the left ovary with associated hemoperitoneum and no evidence of bowel obstruction or pneumoperitoneum and no evidence of appendicitis. I recommend a repeat MRI of the liver with and without IV contrast when the patient returns to the office to assess the stability of the right hepatic dome lesion pending abdominal ultrasound results. The patient agreed and will followup. History of Elevated Liver Enzymes: The blood work performed on September 23, 2020 revealed an elevated ESR of 24 mm/hr, elevated liver enzymes with an AST/ALT/GGTP of 114/184/348 U/L, respectively, an elevated iron level of 186 ug/dl, an elevated cholesterol/triglyceride level of 209/196 mg/dl, respectively, and elevated ferritin level of 187 ng/mL, a reactive hepatitis A IgG antibody. The COVID 19 IgG antibody was positive with an index of 132.00. The blood work performed on January 25, 2021 revealed normalization of the liver enzymes. the total bilirubin was 0.3 mg/dL, the alkaline phosphatase/AST/ALT/GGTP were 92/29/34/30 U/L, respectively. I recommend repeat LFTs to reassess the liver in 6 months. I will try to obtain the recent blood work from the patient's PMD. Fatty Liver: The patient had an imaging study suggestive of fatty infiltration of the liver. The patient denies any jaundice or pruritus. The patient denies any alcohol use. The patient denies taking large doses of nonsteroidal anti-inflammatory drugs or acetaminophen. The findings are suggestive of fatty liver. The patient and I had a long discussion regarding the risks of fatty liver and possible progression to cirrhosis. The patient was told of the possible increased risk of developing liver failure, cirrhosis, ascites, GI bleeding secondary to varices, hepatic encephalopathy, bleeding tendencies and liver cancer. The patient was told of the importance of follow-up. The patient was advised to follow up every 6 months for blood work and imaging studies. The patient agreed and will follow up. The patient was advised to lose weight. I recommend a trial of vitamin E supplementation for the fatty liver. If the liver enzymes remain elevated, the patient may require a trial of Pioglitazone for the fatty liver. I recommend avoid alcohol and hepato-toxic agents. The patient was also advised to avoid NSAIDs, Acetaminophen and any other hepatotoxic drugs. The patient was also advised not to share needles, razors, scissors, nail clippers, etc.. The patient is to continue close follow-up in our office for blood work and exams. If the liver enzymes remain elevated, the patient may require a CT guided liver biopsy to assess the liver parenchyma for possible treatment. We had a long discussion regarding the risks and benefits of the procedure. The patient was told of the risks of bleeding, perforation, infections, emergency surgery and missing lesions. The patient agreed and will follow-up to reassess the symptoms. Blood Work: I recommend blood work to assess the patient's symptoms. I recommend a CBC, SMA 24, amylase, lipase, ESR, Vitamin B 12, folate,. ,iron, TIBC, ferritin level .  I also recommend obtaining the recent blood work performed by the patient's PMD. Follow-up: The patient is to follow-up in the office in 1 year to reassess the symptoms. The patient was told to call the office if any further problems.

## 2024-08-28 NOTE — HISTORY OF PRESENT ILLNESS
[de-identified] : The CAT scan of the abdomen and pelvis performed on May 5, 2009 revealed a probable corpus luteal cyst in the left ovary with associated hemoperitoneum and no evidence of bowel obstruction or pneumoperitoneum and no evidence of appendicitis.  [de-identified] : The abdominal ultrasound performed on January 7, 2023 revealed no change in mild hepatic steatosis noted since January 6, 2022, a 1.3 cm hypoechoic lesion in the right hepatic lobe characterized as a typical hemangioma on MRI performed on March 1, 2021 and a mildly increased size of a right hepatic cyst.   The abdominal ultrasound performed on October 6, 2020 revealed hepatomegaly with hepatic cysts and diffuse hepatic steatosis.  [FreeTextEntry1] : The MRI of the abdomen with IV contrast performed on March 1, 2021 revealed mild to moderate steatosis with an estimated that fraction of 14%. Also noted was a stable 1.1 cm hemangioma in segment 7 at the dome.   The MRI of the liver with and without IV contrast performed on May 15, 2019 revealed a 1.1 cm hepatic dome lesion likely an atypical hemangioma which corresponds to the findings on prior abdominal ultrasound. Also noted was mild diffuse hepatic steatosis a normal gallbladder. There were no additional abnormal findings.

## 2024-08-29 ENCOUNTER — NON-APPOINTMENT (OUTPATIENT)
Age: 44
End: 2024-08-29

## 2024-08-30 LAB
AFP-TM SERPL-MCNC: <1.8 NG/ML
ALBUMIN SERPL ELPH-MCNC: 4.6 G/DL
ALP BLD-CCNC: 58 U/L
ALT SERPL-CCNC: 20 U/L
AMYLASE/CREAT SERPL: 121 U/L
ANION GAP SERPL CALC-SCNC: 13 MMOL/L
AST SERPL-CCNC: 16 U/L
BILIRUB SERPL-MCNC: 0.2 MG/DL
BUN SERPL-MCNC: 18 MG/DL
CALCIUM SERPL-MCNC: 9.4 MG/DL
CHLORIDE SERPL-SCNC: 105 MMOL/L
CO2 SERPL-SCNC: 22 MMOL/L
CREAT SERPL-MCNC: 0.63 MG/DL
EGFR: 111 ML/MIN/1.73M2
ERYTHROCYTE [SEDIMENTATION RATE] IN BLOOD BY WESTERGREN METHOD: 37 MM/HR
ESTIMATED AVERAGE GLUCOSE: 111 MG/DL
FERRITIN SERPL-MCNC: 6 NG/ML
FOLATE SERPL-MCNC: >20 NG/ML
GGT SERPL-CCNC: 29 U/L
GLUCOSE SERPL-MCNC: 92 MG/DL
HBA1C MFR BLD HPLC: 5.5 %
HCT VFR BLD CALC: 35.8 %
HGB BLD-MCNC: 10.6 G/DL
IRON SATN MFR SERPL: 4 %
IRON SERPL-MCNC: 25 UG/DL
LPL SERPL-CCNC: 54 U/L
MCHC RBC-ENTMCNC: 24.3 PG
MCHC RBC-ENTMCNC: 29.6 GM/DL
MCV RBC AUTO: 81.9 FL
PLATELET # BLD AUTO: 279 K/UL
POTASSIUM SERPL-SCNC: 4.3 MMOL/L
PROT SERPL-MCNC: 7.3 G/DL
RBC # BLD: 4.37 M/UL
RBC # FLD: 16.1 %
SODIUM SERPL-SCNC: 140 MMOL/L
TIBC SERPL-MCNC: 557 UG/DL
TSH SERPL-ACNC: 1.78 UIU/ML
UIBC SERPL-MCNC: 532 UG/DL
VIT B12 SERPL-MCNC: 458 PG/ML
WBC # FLD AUTO: 5.7 K/UL

## 2024-10-05 NOTE — CHART NOTE - NSCHARTNOTEFT_GEN_A_CORE
History of Present Illness       The patient is a 43-year-old  female with past medical history significant for hypercholesterolemia and fatty liver who was referred to my office by Dr. Cortney Hester for colon cancer screening. The patient denies any prior exposure to hepatitis A, B or C. The patient denies any large doses of nonsteroidal anti-inflammatory drugs or acetaminophen. The patient denies sharing needles, razors, nail clippers, nail files, scissors, et cetera. The patient denies any EtOH abuse, cocaine use or intravenous drug use. The patient denies any prior blood transfusions, sexual indiscretions, tattoos or piercing. The patient admits to having prior surgery. The history of surgery is significant for a prior D+C. The patient admits to a family history of GI or liver problems. The patient's father had a history of fatty liver. The patient states that she is feeling fine. The patient denies any jaundice or pruritus. The patient denies any lower back pain. The patient denies any abdominal pain. The patient complains of abdominal gas and bloating. The patient denies any gastroesophageal reflux disease or dysphagia. The patient denies any atypical chest pain, shortness of breath or palpitations. The patient denies any diaphoresis. The patient denies any constipation or diarrhea. The patient has 1 bowel movement a day. The patient denies a change in bowel habits. The patient denies a change in caliber of stool. The patient denies having mucus discharge with the bowel movements. The patient denies any bright red blood per rectum, melena or hematemesis. The patient denies any rectal pain or rectal pruritus. The patient complains of fluctuating weight but denies any anorexia. The patient attributes the fluctuating weight to recent change in intermittent fasting diet and exercise. She denies any fevers or chills. The abdominal ultrasound performed on January 7, 2023 revealed no change in mild hepatic steatosis noted since January 6, 2022, a 1.3 cm hypoechoic lesion in the right hepatic lobe characterized as a typical hemangioma on MRI performed on March 1, 2021 and a mildly increased size of a right hepatic cyst. The blood work performed on August 21, 2022 revealed a normal amylase level of 119 U/L, and elevated lipase level of 62 U/L, a normal alpha-fetoprotein level of <1.8 ng/mL, anemia with a hemoglobin/hematocrit level of 10.1/33.6, respectively, a low CO2 of 20 mmol/L, normal liver enzymes with a total bilirubin of 0.2 mg/dL, a normal alkaline phosphatase/AST/ALT/GGTP, 87/18/19/23 U/L, respectively, a low serum iron level of 19 mcg/dL, a low TIBC/U IBC of 467/448 mcg/dL, respectively, a low iron percent saturation of 4%, a low ferritin level of 7 ng/mL, a negative transglutaminase IgG antibody EIA of 1.5 U/ml, a negative transglutaminase IgA antibody EIA of 1.2 U/ml, a negative Gliadin Deamidated IgG antibody of < 5.0 units, a negative Gliadin Deamidated IgA antibody of 12.4 units, a normal serum IgA level of 125 mg/dl, and elevated total cholesterol 215 mg/dL, a normal triglyceride level of 132 mg/dL, a normal rheumatoid factor <10 IU/mL, a low vitamin D level of 28.3 ng/mL, and elevated ESR of 28 mm/h, and a normal TSH of 1.86u IU/mL. The MRI of the abdomen with IV contrast performed on March 1, 2021 revealed mild to moderate steatosis with an estimated that fraction of 14%. Also noted was a stable 1.1 cm hemangioma in segment 7 at the dome. The MRI of the liver with and without IV contrast performed on May 15, 2019 revealed a 1.1 cm hepatic dome lesion likely an atypical hemangioma which corresponds to the findings on recent abdominal ultrasound. Also noted was mild diffuse hepatic steatosis a normal gallbladder. The abdominal ultrasound performed on October 6, 2020 revealed hepatomegaly with hepatic cysts and diffuse hepatic steatosis. There were no additional abnormal findings. The CAT scan of the abdomen and pelvis performed on May 5, 2009 revealed a probable corpus luteal cyst in the left ovary with associated hemoperitoneum and no evidence of bowel obstruction or pneumoperitoneum and no evidence of appendicitis. The blood test performed on November 29, 2021 revealed no evidence of anemia with a hemoglobin/hematocrit level of 11.5/37.4, respectively, a normal alpha-fetoprotein level of <1.8 ng/mL, a low CO2 of 20 mmol/L, normal liver enzymes with a total bilirubin of 0.2 mg/dL, and normal alkaline phosphatase/AST/ALT/GGTP of 87/22/26/33 U/L, respectively, a normal serum iron level of 52 mcg/dL, a normal TIBC/U IBC of 377/325 mcg/dL, respectively, a normal iron percent saturation of 14%, a low ferritin level of 14 ng/mL, an elevated total cholesterol/triglyceride level of 225/262 mg/dL, respectively, a normal amylase/lipase level of 87/31 U/L, respectively, and a normal ESR of 15 mm/h. The patient had a prior history of anemia secondary to heavy menstrual cycles. She was previously followed by her gynecologist, Dr. Escalante. The patient was treated with iron supplementation with improvement of the anemia. The patient was previously followed by a hematologist, Dr. Osiel Parry. She was treated with IV iron supplementation and later switched to PO iron supplementation. The patient admits to having a prior colonoscopy approximately 10 years ago by Dr. Wai Bobby. According to the patient, the colonoscopy was unremarkable. The patient admits to having a prior colonoscopy performed by another gastroenterologist. According to the patient, the colonoscopic findings revealed diverticulosis and internal hemorrhoids. The patient admits to a family history of GI problems. The patient's father had a history of fatty liver.  ?  (-) smoking, (-) ETOH, (-) IVDA  Radiology Summary    CT: The CAT scan of the abdomen and pelvis performed on May 5, 2009 revealed a probable corpus luteal cyst in the left ovary with associated hemoperitoneum and no evidence of bowel obstruction or pneumoperitoneum and no evidence of appendicitis.    MRI: The MRI of the abdomen with IV contrast performed on March 1, 2021 revealed mild to moderate steatosis with an estimated that fraction of 14%. Also noted was a stable 1.1 cm hemangioma in segment 7 at the dome.  ?  The MRI of the liver with and without IV contrast performed on May 15, 2019 revealed a 1.1 cm hepatic dome lesion likely an atypical hemangioma which corresponds to the findings on prior abdominal ultrasound. Also noted was mild diffuse hepatic steatosis a normal gallbladder. There were no additional abnormal findings.    Abdominal Sono: The abdominal ultrasound performed on January 7, 2023 revealed no change in mild hepatic steatosis noted since January 6, 2022, a 1.3 cm hypoechoic lesion in the right hepatic lobe characterized as a typical hemangioma on MRI performed on March 1, 2021 and a mildly increased size of a right hepatic cyst.  The abdominal ultrasound performed on October 6, 2020 revealed hepatomegaly with hepatic cysts and diffuse hepatic steatosis.  ?  Active Problems  Abdominal pain, acute, right upper quadrant (789.01,338.19) (R10.11)  Abnormal finding on imaging of liver (793.3) (R93.2)  Atypical chest pain (786.59) (R07.89)  Colon cancer screening (V76.51) (Z12.11)  Dyspepsia (536.8) (R10.13)  Elevated liver enzymes (790.5) (R74.8)  Encounter for laboratory testing for COVID-19 virus (V01.79) (Z20.822)  Family planning (V25.09) (Z30.09)  Fatty liver (571.8) (K76.0)  GERD without esophagitis (530.81) (K21.9)  Hearing loss (389.9) (H91.90)  High cholesterol (272.0) (E78.00)  History of anemia (V12.3) (Z86.2)       ?  Past Medical History  History of Known health problems: none (V49.89) (Z78.9)       ?  Current Meds  Dicyclomine HCl - 10 MG Oral Capsule; TAKE 1 CAPSULE 3 TIMES DAILY  Famotidine 40 MG Oral Tablet; TAKE 1 TABLET BY MOUTH TWICE A DAY  Meclizine HCl - 25 MG Oral Tablet; TAKE ONE TABLET BY MOUTH EVERY DAY AS  NEEDED FOR DIZZINESS FOR UP TO 14 DAYS  Minastrin 24 Fe 1-20 MG-MCG(24) Oral Tablet Chewable; Take 1 tablet daily       Allergies  No Known Drug Allergies  Fruit  Seasonal Allergies       ?  Vitals  Vital Signs  ?  Recorded: [id number] 09:37AM  Systolic  110, LUE, Sitting  Diastolic  73, LUE, Sitting  Height  5 ft 1 in  Weight  147 lb  BMI Calculated  27.78 kg/m2  BSA Calculated  1.66 m2  Temperature  97.2 F, Temporal  Heart Rate  65  O2 Saturation  99 %, Room Air  FiO2 Flow Rate  0 L/min, Room Air  LMP  [id number]       ?  Physical Exam          Constitutional:  alert, normal voice/communication, healthy appearing, no acute distress, well developed, well nourished . overweight.    Eyes: the sclera and conjunctiva were normal, the sclera and conjunctiva were normal.    ENT: normal hearing, normal lips.gums, normal oropharynx, hearing was normal.    Neck:  the appearance was normal, no neck mass and the appearance of the neck was normal.    Pulmonary:  no respiratory distress, no accessory muscle use, normal respiratory rhythm and effort, lungs were clear to auscultation bilaterally, no accessory muscle use, normal respiratory rhythm and effort, lungs were clear to auscultation bilaterally.    Cardiac:  heart rate was normal and rhythm regular, normal S1 and S2, no murmurs, heart rate was normal and rhythm regular, normal S1 and S2, no murmurs, no gallops, no pericardial rub.    Extremities:. FROM in all extremities.    Abdomen: normal bowel sounds, non-tender, no masses, soft, no no hepato-splenomegaly, normal bowel sounds, non-tender, no abdominal mass palpated, soft    Rectal:. no masses.    Back: no CVA tenderness.    Musculoskeletal: normal gait, no involuntary movements were seen, muscle strength and tone were normal.    Skin: normal skin color and pigmentation, no rash, normal skin turgor.    Neurology: the sensory exam was normal to light touch and pinprick, no focal deficits, the motor exam was normal.    Psychiatric: oriented to person, place, and time, the affect was normal.  ?  Assessment  Fatty liver (571.8) (K76.0)  Dyspepsia (536.8) (R10.13)  Abnormal finding on imaging of liver (793.3) (R93.2)  Colon cancer screening (V76.51) (Z12.11)  Dyspepsia: The patient complains of dyspeptic symptoms. The patient was advised to continue to abide by an anti-gas (low FOD-MAP) diet. The patient was previously given a pamphlet for anti-gas (low FOD-MAP). The patient and I reviewed the anti-gas (low FOD-MAP)diet at length again. The patient is to continue on a trial of Simethicone one tablet 4 times a day p.r.n. abdominal pain and gas.  Abnormal Imaging Study: The MRI of the liver with and without IV contrast performed on May 15, 2019 revealed a 1.1 cm hepatic dome lesion likely an atypical hemangioma which corresponds to the findings on prior abdominal ultrasound. Also noted was mild diffuse hepatic steatosis a normal gallbladder. The MRI of the abdomen with IV contrast performed on March 1, 2021 revealed mild to moderate steatosis with an estimated that fraction of 14%. Also noted was a stable 1.1 cm hemangioma in segment 7 at the dome. The abdominal ultrasound performed on October 6, 2020 revealed hepatomegaly with hepatic cysts and diffuse hepatic steatosis. There were no additional abnormal findings. The CAT scan of the abdomen and pelvis performed on May 5, 2009 revealed a probable corpus luteal cyst in the left ovary with associated hemoperitoneum and no evidence of bowel obstruction or pneumoperitoneum and no evidence of appendicitis. I recommend a repeat MRI of the liver with and without IV contrast when the patient returns to the office to assess the stability of the right hepatic dome lesion pending abdominal ultrasound results. The patient agreed and will followup.  History of Elevated Liver Enzymes: The blood work performed on September 23, 2020 revealed an elevated ESR of 24 mm/hr, elevated liver enzymes with an AST/ALT/GGTP of 114/184/348 U/L, respectively, an elevated iron level of 186 ug/dl, an elevated cholesterol/triglyceride level of 209/196 mg/dl, respectively, and elevated ferritin level of 187 ng/mL, a reactive hepatitis A IgG antibody. The COVID 19 IgG antibody was positive with an index of 132.00. The blood work performed on January 25, 2021 revealed normalization of the liver enzymes.  the total bilirubin was 0.3 mg/dL, the alkaline phosphatase/AST/ALT/GGTP were 92/29/34/30 U/L, respectively. I recommend repeat LFTs to reassess the liver in 6 months. I will try to obtain the recent blood work from the patient's PMD.  Fatty Liver: The patient had an imaging study suggestive of fatty infiltration of the liver. The patient denies any jaundice or pruritus. The patient denies any alcohol use. The patient denies taking large doses of nonsteroidal anti-inflammatory drugs or acetaminophen. The findings are suggestive of fatty liver. The patient and I had a long discussion regarding the risks of fatty liver and possible progression to cirrhosis. The patient was told of the possible increased risk of developing liver failure, cirrhosis, ascites, GI bleeding secondary to varices, hepatic encephalopathy, bleeding tendencies and liver cancer. The patient was told of the importance of follow-up. The patient was advised to follow up every 6 months for blood work and imaging studies. The patient agreed and will follow up. The patient was advised to lose weight. I recommend a trial of vitamin E supplementation for the fatty liver. If the liver enzymes remain elevated, the patient may require a trial of Pioglitazone for the fatty liver. I recommend avoid alcohol and hepato-toxic agents. The patient was also advised to avoid NSAIDs, Acetaminophen and any other hepatotoxic drugs. The patient was also advised not to share needles, razors, scissors, nail clippers, etc.. The patient is to continue close follow-up in our office for blood work and exams. If the liver enzymes remain elevated, the patient may require a CT guided liver biopsy to assess the liver parenchyma for possible treatment. We had a long discussion regarding the risks and benefits of the procedure. The patient was told of the risks of bleeding, perforation, infections, emergency surgery and missing lesions. The patient agreed and will follow-up to reassess the symptoms.  Colon Cancer screening: I recommend colonoscopy for colon cancer screening over the age of 45 to assess for colonic polyps. The patient was told of the risks and benefits of the procedure. The patient was told of the risks of perforation, emergency surgery, bleeding, infections and missed lesions. The patient is to be on a clear liquid diet the entire day prior to the procedure. The patient is to complete the entire prescribed bowel prep the day prior to the procedure as directed. The patient is told not to drive, drink alcohol, use recreational drugs, exercise, or work the day of the procedure. The patient was told of the need for an escort to accompany the patient home after the procedure. The patient is aware that the procedure may be cancelled if they fail to follow the directions. The patient agreed and will schedule for the procedure. The patient is to be n.p.o. after midnight and bowel prep was given. The patient is to return for the procedure.  Follow-up: The patient is to follow-up in the office in 6 months to reassess the symptoms. The patient was told to call the office if any further problems.          ?  Plan  Colon cancer screening  Start: Clenpiq 10-3.5-12 MG-GM -GM/175ML Oral Solution; TAKE 175 ML Twice daily  Colonoscopy Screening; Status:Hold For - Scheduling; Requested for:[id number];  Start: PEG-3350/Electrolytes 236 GM Oral Solution Reconstituted; MIX AS DIRECTED  AND DRINK OVER 4 HOURS, START AT 4PM  Dyspepsia  Start: Simethicone 125 MG Oral Tablet Chewable; CHEW AND SWALLOW 1 TABLET 4  TIMES DAILY, AFTER MEALS AND AT BEDTIME History of Present Illness       The patient is a 43-year-old  female with past medical history significant for hypercholesterolemia and fatty liver who was referred to my office by Dr. Cortney Hester for colon cancer screening. The patient denies any prior exposure to hepatitis A, B or C. The patient denies any large doses of nonsteroidal anti-inflammatory drugs or acetaminophen. The patient denies sharing needles, razors, nail clippers, nail files, scissors, et cetera. The patient denies any EtOH abuse, cocaine use or intravenous drug use. The patient denies any prior blood transfusions, sexual indiscretions, tattoos or piercing. The patient admits to having prior surgery. The history of surgery is significant for a prior D+C. The patient admits to a family history of GI or liver problems. The patient's father had a history of fatty liver. The patient states that she is feeling fine. The patient denies any jaundice or pruritus. The patient denies any lower back pain. The patient denies any abdominal pain. The patient complains of abdominal gas and bloating. The patient denies any gastroesophageal reflux disease or dysphagia. The patient denies any atypical chest pain, shortness of breath or palpitations. The patient denies any diaphoresis. The patient denies any constipation or diarrhea. The patient has 1 bowel movement a day. The patient denies a change in bowel habits. The patient denies a change in caliber of stool. The patient denies having mucus discharge with the bowel movements. The patient denies any bright red blood per rectum, melena or hematemesis. The patient denies any rectal pain or rectal pruritus. The patient complains of fluctuating weight but denies any anorexia. The patient attributes the fluctuating weight to recent change in intermittent fasting diet and exercise. She denies any fevers or chills. The abdominal ultrasound performed on January 7, 2023 revealed no change in mild hepatic steatosis noted since January 6, 2022, a 1.3 cm hypoechoic lesion in the right hepatic lobe characterized as a typical hemangioma on MRI performed on March 1, 2021 and a mildly increased size of a right hepatic cyst. The blood work performed on August 21, 2022 revealed a normal amylase level of 119 U/L, and elevated lipase level of 62 U/L, a normal alpha-fetoprotein level of <1.8 ng/mL, anemia with a hemoglobin/hematocrit level of 10.1/33.6, respectively, a low CO2 of 20 mmol/L, normal liver enzymes with a total bilirubin of 0.2 mg/dL, a normal alkaline phosphatase/AST/ALT/GGTP, 87/18/19/23 U/L, respectively, a low serum iron level of 19 mcg/dL, a low TIBC/U IBC of 467/448 mcg/dL, respectively, a low iron percent saturation of 4%, a low ferritin level of 7 ng/mL, a negative transglutaminase IgG antibody EIA of 1.5 U/ml, a negative transglutaminase IgA antibody EIA of 1.2 U/ml, a negative Gliadin Deamidated IgG antibody of < 5.0 units, a negative Gliadin Deamidated IgA antibody of 12.4 units, a normal serum IgA level of 125 mg/dl, and elevated total cholesterol 215 mg/dL, a normal triglyceride level of 132 mg/dL, a normal rheumatoid factor <10 IU/mL, a low vitamin D level of 28.3 ng/mL, and elevated ESR of 28 mm/h, and a normal TSH of 1.86u IU/mL. The MRI of the abdomen with IV contrast performed on March 1, 2021 revealed mild to moderate steatosis with an estimated that fraction of 14%. Also noted was a stable 1.1 cm hemangioma in segment 7 at the dome. The MRI of the liver with and without IV contrast performed on May 15, 2019 revealed a 1.1 cm hepatic dome lesion likely an atypical hemangioma which corresponds to the findings on recent abdominal ultrasound. Also noted was mild diffuse hepatic steatosis a normal gallbladder. The abdominal ultrasound performed on October 6, 2020 revealed hepatomegaly with hepatic cysts and diffuse hepatic steatosis. There were no additional abnormal findings. The CAT scan of the abdomen and pelvis performed on May 5, 2009 revealed a probable corpus luteal cyst in the left ovary with associated hemoperitoneum and no evidence of bowel obstruction or pneumoperitoneum and no evidence of appendicitis. The blood test performed on November 29, 2021 revealed no evidence of anemia with a hemoglobin/hematocrit level of 11.5/37.4, respectively, a normal alpha-fetoprotein level of <1.8 ng/mL, a low CO2 of 20 mmol/L, normal liver enzymes with a total bilirubin of 0.2 mg/dL, and normal alkaline phosphatase/AST/ALT/GGTP of 87/22/26/33 U/L, respectively, a normal serum iron level of 52 mcg/dL, a normal TIBC/U IBC of 377/325 mcg/dL, respectively, a normal iron percent saturation of 14%, a low ferritin level of 14 ng/mL, an elevated total cholesterol/triglyceride level of 225/262 mg/dL, respectively, a normal amylase/lipase level of 87/31 U/L, respectively, and a normal ESR of 15 mm/h. The patient had a prior history of anemia secondary to heavy menstrual cycles. She was previously followed by her gynecologist, Dr. Escalante. The patient was treated with iron supplementation with improvement of the anemia. The patient was previously followed by a hematologist, Dr. Osiel Parry. She was treated with IV iron supplementation and later switched to PO iron supplementation. The patient admits to having a prior colonoscopy approximately 10 years ago by Dr. Wai Bobby. According to the patient, the colonoscopy was unremarkable. The patient admits to having a prior colonoscopy performed by another gastroenterologist. According to the patient, the colonoscopic findings revealed diverticulosis and internal hemorrhoids. The patient admits to a family history of GI problems. The patient's father had a history of fatty liver.  ?  (-) smoking, (-) ETOH, (-) IVDA  Radiology Summary    CT: The CAT scan of the abdomen and pelvis performed on May 5, 2009 revealed a probable corpus luteal cyst in the left ovary with associated hemoperitoneum and no evidence of bowel obstruction or pneumoperitoneum and no evidence of appendicitis.    MRI: The MRI of the abdomen with IV contrast performed on March 1, 2021 revealed mild to moderate steatosis with an estimated that fraction of 14%. Also noted was a stable 1.1 cm hemangioma in segment 7 at the dome.  ?  The MRI of the liver with and without IV contrast performed on May 15, 2019 revealed a 1.1 cm hepatic dome lesion likely an atypical hemangioma which corresponds to the findings on prior abdominal ultrasound. Also noted was mild diffuse hepatic steatosis a normal gallbladder. There were no additional abnormal findings.    Abdominal Sono: The abdominal ultrasound performed on January 7, 2023 revealed no change in mild hepatic steatosis noted since January 6, 2022, a 1.3 cm hypoechoic lesion in the right hepatic lobe characterized as a typical hemangioma on MRI performed on March 1, 2021 and a mildly increased size of a right hepatic cyst.  The abdominal ultrasound performed on October 6, 2020 revealed hepatomegaly with hepatic cysts and diffuse hepatic steatosis.  ?  Active Problems  Abdominal pain, acute, right upper quadrant (789.01,338.19) (R10.11)  Abnormal finding on imaging of liver (793.3) (R93.2)  Atypical chest pain (786.59) (R07.89)  Colon cancer screening (V76.51) (Z12.11)  Dyspepsia (536.8) (R10.13)  Elevated liver enzymes (790.5) (R74.8)  Encounter for laboratory testing for COVID-19 virus (V01.79) (Z20.822)  Family planning (V25.09) (Z30.09)  Fatty liver (571.8) (K76.0)  GERD without esophagitis (530.81) (K21.9)  Hearing loss (389.9) (H91.90)  High cholesterol (272.0) (E78.00)  History of anemia (V12.3) (Z86.2)       ?  Past Medical History  History of Known health problems: none (V49.89) (Z78.9)       ?  Current Meds  Dicyclomine HCl - 10 MG Oral Capsule; TAKE 1 CAPSULE 3 TIMES DAILY  Famotidine 40 MG Oral Tablet; TAKE 1 TABLET BY MOUTH TWICE A DAY  Meclizine HCl - 25 MG Oral Tablet; TAKE ONE TABLET BY MOUTH EVERY DAY AS  NEEDED FOR DIZZINESS FOR UP TO 14 DAYS  Minastrin 24 Fe 1-20 MG-MCG(24) Oral Tablet Chewable; Take 1 tablet daily       Allergies  No Known Drug Allergies  Fruit  Seasonal Allergies       ?  Vitals  Vital Signs  ?  Recorded: [id number] 09:37AM  Systolic  110, LUE, Sitting  Diastolic  73, LUE, Sitting  Height  5 ft 1 in  Weight  147 lb  BMI Calculated  27.78 kg/m2  BSA Calculated  1.66 m2  Temperature  97.2 F, Temporal  Heart Rate  65  O2 Saturation  99 %, Room Air  FiO2 Flow Rate  0 L/min, Room Air  LMP  [id number]       ?  Physical Exam          Constitutional:  alert, normal voice/communication, healthy appearing, no acute distress, well developed, well nourished . overweight.    Eyes: the sclera and conjunctiva were normal, the sclera and conjunctiva were normal.    ENT: normal hearing, normal lips.gums, normal oropharynx, hearing was normal.    Neck:  the appearance was normal, no neck mass and the appearance of the neck was normal.    Pulmonary:  no respiratory distress, no accessory muscle use, normal respiratory rhythm and effort, lungs were clear to auscultation bilaterally, no accessory muscle use, normal respiratory rhythm and effort, lungs were clear to auscultation bilaterally.    Cardiac:  heart rate was normal and rhythm regular, normal S1 and S2, no murmurs, heart rate was normal and rhythm regular, normal S1 and S2, no murmurs, no gallops, no pericardial rub.    Extremities:. FROM in all extremities.    Abdomen: normal bowel sounds, non-tender, no masses, soft, no no hepato-splenomegaly, normal bowel sounds, non-tender, no abdominal mass palpated, soft    Rectal:. no masses.    Back: no CVA tenderness.    Musculoskeletal: normal gait, no involuntary movements were seen, muscle strength and tone were normal.    Skin: normal skin color and pigmentation, no rash, normal skin turgor.    Neurology: the sensory exam was normal to light touch and pinprick, no focal deficits, the motor exam was normal.    Psychiatric: oriented to person, place, and time, the affect was normal.  ?  Assessment  Fatty liver (571.8) (K76.0)  Dyspepsia (536.8) (R10.13)  Abnormal finding on imaging of liver (793.3) (R93.2)  Colon cancer screening (V76.51) (Z12.11)  Dyspepsia: The patient complains of dyspeptic symptoms. The patient was advised to continue to abide by an anti-gas (low FOD-MAP) diet. The patient was previously given a pamphlet for anti-gas (low FOD-MAP). The patient and I reviewed the anti-gas (low FOD-MAP)diet at length again. The patient is to continue on a trial of Simethicone one tablet 4 times a day p.r.n. abdominal pain and gas.  Abnormal Imaging Study: The MRI of the liver with and without IV contrast performed on May 15, 2019 revealed a 1.1 cm hepatic dome lesion likely an atypical hemangioma which corresponds to the findings on prior abdominal ultrasound. Also noted was mild diffuse hepatic steatosis a normal gallbladder. The MRI of the abdomen with IV contrast performed on March 1, 2021 revealed mild to moderate steatosis with an estimated that fraction of 14%. Also noted was a stable 1.1 cm hemangioma in segment 7 at the dome. The abdominal ultrasound performed on October 6, 2020 revealed hepatomegaly with hepatic cysts and diffuse hepatic steatosis. There were no additional abnormal findings. The CAT scan of the abdomen and pelvis performed on May 5, 2009 revealed a probable corpus luteal cyst in the left ovary with associated hemoperitoneum and no evidence of bowel obstruction or pneumoperitoneum and no evidence of appendicitis. I recommend a repeat MRI of the liver with and without IV contrast when the patient returns to the office to assess the stability of the right hepatic dome lesion pending abdominal ultrasound results. The patient agreed and will followup.  History of Elevated Liver Enzymes: The blood work performed on September 23, 2020 revealed an elevated ESR of 24 mm/hr, elevated liver enzymes with an AST/ALT/GGTP of 114/184/348 U/L, respectively, an elevated iron level of 186 ug/dl, an elevated cholesterol/triglyceride level of 209/196 mg/dl, respectively, and elevated ferritin level of 187 ng/mL, a reactive hepatitis A IgG antibody. The COVID 19 IgG antibody was positive with an index of 132.00. The blood work performed on January 25, 2021 revealed normalization of the liver enzymes.  the total bilirubin was 0.3 mg/dL, the alkaline phosphatase/AST/ALT/GGTP were 92/29/34/30 U/L, respectively. I recommend repeat LFTs to reassess the liver in 6 months. I will try to obtain the recent blood work from the patient's PMD.  Fatty Liver: The patient had an imaging study suggestive of fatty infiltration of the liver. The patient denies any jaundice or pruritus. The patient denies any alcohol use. The patient denies taking large doses of nonsteroidal anti-inflammatory drugs or acetaminophen. The findings are suggestive of fatty liver. The patient and I had a long discussion regarding the risks of fatty liver and possible progression to cirrhosis. The patient was told of the possible increased risk of developing liver failure, cirrhosis, ascites, GI bleeding secondary to varices, hepatic encephalopathy, bleeding tendencies and liver cancer. The patient was told of the importance of follow-up. The patient was advised to follow up every 6 months for blood work and imaging studies. The patient agreed and will follow up. The patient was advised to lose weight. I recommend a trial of vitamin E supplementation for the fatty liver. If the liver enzymes remain elevated, the patient may require a trial of Pioglitazone for the fatty liver. I recommend avoid alcohol and hepato-toxic agents. The patient was also advised to avoid NSAIDs, Acetaminophen and any other hepatotoxic drugs. The patient was also advised not to share needles, razors, scissors, nail clippers, etc.. The patient is to continue close follow-up in our office for blood work and exams. If the liver enzymes remain elevated, the patient may require a CT guided liver biopsy to assess the liver parenchyma for possible treatment. We had a long discussion regarding the risks and benefits of the procedure. The patient was told of the risks of bleeding, perforation, infections, emergency surgery and missing lesions. The patient agreed and will follow-up to reassess the symptoms.  Colon Cancer screening: I recommend colonoscopy for colon cancer screening over the age of 45 to assess for colonic polyps. The patient was told of the risks and benefits of the procedure. The patient was told of the risks of perforation, emergency surgery, bleeding, infections and missed lesions. The patient is to be on a clear liquid diet the entire day prior to the procedure. The patient is to complete the entire prescribed bowel prep the day prior to the procedure as directed. The patient is told not to drive, drink alcohol, use recreational drugs, exercise, or work the day of the procedure. The patient was told of the need for an escort to accompany the patient home after the procedure. The patient is aware that the procedure may be cancelled if they fail to follow the directions. The patient agreed and will schedule for the procedure. The patient is to be n.p.o. after midnight and bowel prep was given. The patient is to return for the procedure.  Follow-up: The patient is to follow-up in the office in 6 months to reassess the symptoms. The patient was told to call the office if any further problems.          ?  Plan  Colon cancer screening  Start: Clenpiq 10-3.5-12 MG-GM -GM/175ML Oral Solution; TAKE 175 ML Twice daily  Colonoscopy Screening; Status:Hold For - Scheduling; Requested for:[id number];  Start: PEG-3350/Electrolytes 236 GM Oral Solution Reconstituted; MIX AS DIRECTED  AND DRINK OVER 4 HOURS, START AT 4PM  Dyspepsia  Start: Simethicone 125 MG Oral Tablet Chewable; CHEW AND SWALLOW 1 TABLET 4  TIMES DAILY, AFTER MEALS AND AT BEDTIME WFL

## 2025-03-11 ENCOUNTER — APPOINTMENT (OUTPATIENT)
Dept: ENDOCRINOLOGY | Facility: CLINIC | Age: 45
End: 2025-03-11
Payer: COMMERCIAL

## 2025-03-11 VITALS
BODY MASS INDEX: 28.51 KG/M2 | RESPIRATION RATE: 16 BRPM | HEART RATE: 80 BPM | OXYGEN SATURATION: 98 % | TEMPERATURE: 96.7 F | SYSTOLIC BLOOD PRESSURE: 127 MMHG | HEIGHT: 61 IN | DIASTOLIC BLOOD PRESSURE: 80 MMHG | WEIGHT: 151 LBS

## 2025-03-11 DIAGNOSIS — E04.1 NONTOXIC SINGLE THYROID NODULE: ICD-10-CM

## 2025-03-11 PROCEDURE — 99214 OFFICE O/P EST MOD 30 MIN: CPT

## 2025-03-11 PROCEDURE — G2211 COMPLEX E/M VISIT ADD ON: CPT | Mod: NC

## 2025-05-22 ENCOUNTER — APPOINTMENT (OUTPATIENT)
Age: 45
End: 2025-05-22
Payer: COMMERCIAL

## 2025-05-22 VITALS
HEIGHT: 61 IN | BODY MASS INDEX: 28.51 KG/M2 | HEART RATE: 91 BPM | SYSTOLIC BLOOD PRESSURE: 123 MMHG | DIASTOLIC BLOOD PRESSURE: 85 MMHG | OXYGEN SATURATION: 98 % | WEIGHT: 151 LBS

## 2025-05-22 DIAGNOSIS — S76.311A STRAIN OF MUSCLE, FASCIA AND TENDON OF THE POSTERIOR MUSCLE GROUP AT THIGH LEVEL, RIGHT THIGH, INITIAL ENCOUNTER: ICD-10-CM

## 2025-05-22 DIAGNOSIS — S76.312A STRAIN OF MUSCLE, FASCIA AND TENDON OF THE POSTERIOR MUSCLE GROUP AT THIGH LEVEL, LEFT THIGH, INITIAL ENCOUNTER: ICD-10-CM

## 2025-05-22 PROCEDURE — 99203 OFFICE O/P NEW LOW 30 MIN: CPT

## 2025-07-22 ENCOUNTER — APPOINTMENT (OUTPATIENT)
Age: 45
End: 2025-07-22

## 2025-08-27 ENCOUNTER — APPOINTMENT (OUTPATIENT)
Dept: GASTROENTEROLOGY | Facility: CLINIC | Age: 45
End: 2025-08-27
Payer: COMMERCIAL

## 2025-08-27 VITALS
BODY MASS INDEX: 24.92 KG/M2 | DIASTOLIC BLOOD PRESSURE: 78 MMHG | HEIGHT: 61 IN | HEART RATE: 84 BPM | WEIGHT: 132 LBS | TEMPERATURE: 97.7 F | SYSTOLIC BLOOD PRESSURE: 112 MMHG | OXYGEN SATURATION: 98 %

## 2025-08-27 DIAGNOSIS — Z86.2 PERSONAL HISTORY OF DISEASES OF THE BLOOD AND BLOOD-FORMING ORGANS AND CERTAIN DISORDERS INVOLVING THE IMMUNE MECHANISM: ICD-10-CM

## 2025-08-27 DIAGNOSIS — K76.0 FATTY (CHANGE OF) LIVER, NOT ELSEWHERE CLASSIFIED: ICD-10-CM

## 2025-08-27 DIAGNOSIS — E53.8 DEFICIENCY OF OTHER SPECIFIED B GROUP VITAMINS: ICD-10-CM

## 2025-08-27 DIAGNOSIS — R93.2 ABNORMAL FINDINGS ON DIAGNOSTIC IMAGING OF LIVER AND BILIARY TRACT: ICD-10-CM

## 2025-08-27 PROCEDURE — 99214 OFFICE O/P EST MOD 30 MIN: CPT

## 2025-08-27 RX ORDER — TIRZEPATIDE 15 MG/.5ML
INJECTION, SOLUTION SUBCUTANEOUS
Refills: 0 | Status: ACTIVE | COMMUNITY

## 2025-08-29 LAB
A1AT SERPL-MCNC: 173 MG/DL
AFP-TM SERPL-MCNC: <1.8 NG/ML
ALBUMIN SERPL ELPH-MCNC: 4.6 G/DL
ALP BLD-CCNC: 58 U/L
ALT SERPL-CCNC: 32 U/L
ANION GAP SERPL CALC-SCNC: 14 MMOL/L
AST SERPL-CCNC: 18 U/L
BILIRUB SERPL-MCNC: 0.3 MG/DL
BUN SERPL-MCNC: 11 MG/DL
CALCIUM SERPL-MCNC: 9.4 MG/DL
CERULOPLASMIN SERPL-MCNC: 49 MG/DL
CHLORIDE SERPL-SCNC: 104 MMOL/L
CHOLEST SERPL-MCNC: 197 MG/DL
CO2 SERPL-SCNC: 20 MMOL/L
CREAT SERPL-MCNC: 0.72 MG/DL
EGFRCR SERPLBLD CKD-EPI 2021: 104 ML/MIN/1.73M2
ENDOMYSIUM IGA SER QL: NEGATIVE
ENDOMYSIUM IGA TITR SER: NORMAL
ERYTHROCYTE [SEDIMENTATION RATE] IN BLOOD BY WESTERGREN METHOD: 11 MM/HR
ESTIMATED AVERAGE GLUCOSE: 94 MG/DL
FERRITIN SERPL-MCNC: 50 NG/ML
GGT SERPL-CCNC: 33 U/L
GLIADIN IGA SER QL: 2.7 U/ML
GLIADIN IGG SER QL: <0.4 U/ML
GLIADIN PEPTIDE IGA SER-ACNC: NEGATIVE
GLIADIN PEPTIDE IGG SER-ACNC: NEGATIVE
GLUCOSE SERPL-MCNC: 80 MG/DL
HBA1C MFR BLD HPLC: 4.9 %
HBV CORE IGG+IGM SER QL: NONREACTIVE
HBV CORE IGM SER QL: NONREACTIVE
HBV SURFACE AB SER QL: NONREACTIVE
HBV SURFACE AG SER QL: NONREACTIVE
HCT VFR BLD CALC: 40.6 %
HCV AB SER QL: NONREACTIVE
HCV S/CO RATIO: 0.09 S/CO
HDLC SERPL-MCNC: 41 MG/DL
HEPATITIS A IGG ANTIBODY: NONREACTIVE
HEV AB SER QL: NEGATIVE
HGB BLD-MCNC: 13.7 G/DL
IGA SERPL-MCNC: 105 MG/DL
IRON SATN MFR SERPL: 22 %
IRON SERPL-MCNC: 88 UG/DL
LDLC SERPL-MCNC: 130 MG/DL
LPL SERPL-CCNC: 83 U/L
MCHC RBC-ENTMCNC: 31.8 PG
MCHC RBC-ENTMCNC: 33.7 G/DL
MCV RBC AUTO: 94.2 FL
MITOCHONDRIA AB SER IF-ACNC: NORMAL
NONHDLC SERPL-MCNC: 156 MG/DL
PLATELET # BLD AUTO: 254 K/UL
POTASSIUM SERPL-SCNC: 4 MMOL/L
PROT SERPL-MCNC: 7.3 G/DL
RBC # BLD: 4.31 M/UL
RBC # FLD: 12.7 %
RHEUMATOID FACT SERPL-ACNC: <10 IU/ML
SMOOTH MUSCLE AB SER QL IF: NORMAL
SODIUM SERPL-SCNC: 139 MMOL/L
TIBC SERPL-MCNC: 397 UG/DL
TRIGL SERPL-MCNC: 145 MG/DL
TSH SERPL-ACNC: 1.23 UIU/ML
TTG IGA SER IA-ACNC: <0.5 U/ML
TTG IGA SER-ACNC: NEGATIVE
TTG IGG SER IA-ACNC: <0.8 U/ML
TTG IGG SER IA-ACNC: NEGATIVE
UIBC SERPL-MCNC: 309 UG/DL
WBC # FLD AUTO: 5.46 K/UL

## 2025-09-01 ENCOUNTER — NON-APPOINTMENT (OUTPATIENT)
Age: 45
End: 2025-09-01

## 2025-09-01 LAB
24R-OH-CALCIDIOL SERPL-MCNC: 71.1 PG/ML
AMYLASE/CREAT SERPL: 135 U/L
ANA TITR SER: NEGATIVE
HBV E AB SER QL: NONREACTIVE
HBV E AG SER QL: NONREACTIVE
M TB IFN-G BLD-IMP: NEGATIVE
QUANTIFERON TB PLUS MITOGEN MINUS NIL: >10 IU/ML
QUANTIFERON TB PLUS NIL: 0.02 IU/ML
QUANTIFERON TB PLUS TB1 MINUS NIL: 0 IU/ML
QUANTIFERON TB PLUS TB2 MINUS NIL: 0 IU/ML

## (undated) DEVICE — CATH ELCTR GLIDE PRB 7FR

## (undated) DEVICE — SYR LUER LOK 50CC

## (undated) DEVICE — SOL INJ NS 0.9% 500ML 1-PORT

## (undated) DEVICE — TUBING ENDO EXT OLYMPUS 160 24HR USE GI

## (undated) DEVICE — RETRIEVER ROTH NET PLATINUM-UNIVERSAL

## (undated) DEVICE — NDL INJ SCLERO INTERJECT 23G

## (undated) DEVICE — SNARE LOOP POLY DISP 30MM LOOP

## (undated) DEVICE — ADAPTER ENDO CHNL SINGLE USE

## (undated) DEVICE — CLAMP BX HOT RAD JAW 3

## (undated) DEVICE — TUBING CANNULA SALTER LABS NASAL ADULT 7FT

## (undated) DEVICE — STERIS DEFENDO 3-PIECE KIT (AIR/WATER, SUCTION & BIOPSY VALVES)

## (undated) DEVICE — VALVE ENDO SURESEAL II 0-5FR

## (undated) DEVICE — SOLIDIFIER ISOLYZER 2000 CC

## (undated) DEVICE — FORCEP RADIAL JAW 4 W NDL 2.2MM 2.8MM 240CM ORANGE DISP

## (undated) DEVICE — KIT ENDO PROCEDURE CUST W/VLV

## (undated) DEVICE — DRSG CURITY GAUZE SPONGE 4 X 4" 12-PLY

## (undated) DEVICE — FORCEP BIOPSY 2.5MM DISP

## (undated) DEVICE — TUBING IV SET GRAVITY 3Y 100" MACRO

## (undated) DEVICE — LUBRICATING JELLY ONESHOT 1.25OZ

## (undated) DEVICE — Device

## (undated) DEVICE — BITE BLOCK ADULT 20 X 27MM (GREEN)

## (undated) DEVICE — TUBING MEDI-VAC W MAXIGRIP CONNECTORS 1/4"X6'

## (undated) DEVICE — VENODYNE/SCD SLEEVE CALF MEDIUM

## (undated) DEVICE — SENSOR O2 FINGER ADULT